# Patient Record
Sex: MALE | Race: WHITE | Employment: OTHER | URBAN - METROPOLITAN AREA
[De-identification: names, ages, dates, MRNs, and addresses within clinical notes are randomized per-mention and may not be internally consistent; named-entity substitution may affect disease eponyms.]

---

## 2017-10-30 ENCOUNTER — DOCUMENTATION ONLY (OUTPATIENT)
Dept: LAB | Facility: CLINIC | Age: 67
End: 2017-10-30

## 2017-10-30 DIAGNOSIS — Z00.00 ROUTINE GENERAL MEDICAL EXAMINATION AT A HEALTH CARE FACILITY: Primary | ICD-10-CM

## 2017-10-30 DIAGNOSIS — N18.30 CKD (CHRONIC KIDNEY DISEASE) STAGE 3, GFR 30-59 ML/MIN (H): ICD-10-CM

## 2017-10-30 DIAGNOSIS — E78.5 HYPERLIPIDEMIA LDL GOAL <160: ICD-10-CM

## 2017-10-30 NOTE — PROGRESS NOTES
Please review, associate diagnosis, and sign pending pre physical lab orders for patient's upcoming 11/3/17 lab appointment.     Thank you,  Lab

## 2017-11-10 DIAGNOSIS — N18.30 CKD (CHRONIC KIDNEY DISEASE) STAGE 3, GFR 30-59 ML/MIN (H): ICD-10-CM

## 2017-11-10 DIAGNOSIS — Z00.00 ROUTINE GENERAL MEDICAL EXAMINATION AT A HEALTH CARE FACILITY: ICD-10-CM

## 2017-11-10 DIAGNOSIS — E78.5 HYPERLIPIDEMIA LDL GOAL <160: ICD-10-CM

## 2017-11-10 LAB
ERYTHROCYTE [DISTWIDTH] IN BLOOD BY AUTOMATED COUNT: 13.8 % (ref 10–15)
HCT VFR BLD AUTO: 47.4 % (ref 40–53)
HGB BLD-MCNC: 15.6 G/DL (ref 13.3–17.7)
MCH RBC QN AUTO: 31.2 PG (ref 26.5–33)
MCHC RBC AUTO-ENTMCNC: 32.9 G/DL (ref 31.5–36.5)
MCV RBC AUTO: 95 FL (ref 78–100)
PLATELET # BLD AUTO: 234 10E9/L (ref 150–450)
RBC # BLD AUTO: 5 10E12/L (ref 4.4–5.9)
WBC # BLD AUTO: 5.7 10E9/L (ref 4–11)

## 2017-11-10 PROCEDURE — 86803 HEPATITIS C AB TEST: CPT | Performed by: INTERNAL MEDICINE

## 2017-11-10 PROCEDURE — 80061 LIPID PANEL: CPT | Performed by: INTERNAL MEDICINE

## 2017-11-10 PROCEDURE — 36415 COLL VENOUS BLD VENIPUNCTURE: CPT | Performed by: INTERNAL MEDICINE

## 2017-11-10 PROCEDURE — 85027 COMPLETE CBC AUTOMATED: CPT | Performed by: INTERNAL MEDICINE

## 2017-11-10 PROCEDURE — 80053 COMPREHEN METABOLIC PANEL: CPT | Performed by: INTERNAL MEDICINE

## 2017-11-11 LAB
ALBUMIN SERPL-MCNC: 4.2 G/DL (ref 3.4–5)
ALP SERPL-CCNC: 75 U/L (ref 40–150)
ALT SERPL W P-5'-P-CCNC: 15 U/L (ref 0–70)
ANION GAP SERPL CALCULATED.3IONS-SCNC: 11 MMOL/L (ref 3–14)
AST SERPL W P-5'-P-CCNC: 18 U/L (ref 0–45)
BILIRUB SERPL-MCNC: 1.1 MG/DL (ref 0.2–1.3)
BUN SERPL-MCNC: 10 MG/DL (ref 7–30)
CALCIUM SERPL-MCNC: 9.1 MG/DL (ref 8.5–10.1)
CHLORIDE SERPL-SCNC: 98 MMOL/L (ref 94–109)
CHOLEST SERPL-MCNC: 259 MG/DL
CO2 SERPL-SCNC: 24 MMOL/L (ref 20–32)
CREAT SERPL-MCNC: 1.21 MG/DL (ref 0.66–1.25)
GFR SERPL CREATININE-BSD FRML MDRD: 60 ML/MIN/1.7M2
GLUCOSE SERPL-MCNC: 90 MG/DL (ref 70–99)
HCV AB SERPL QL IA: NONREACTIVE
HDLC SERPL-MCNC: 63 MG/DL
LDLC SERPL CALC-MCNC: 168 MG/DL
NONHDLC SERPL-MCNC: 196 MG/DL
POTASSIUM SERPL-SCNC: 4.5 MMOL/L (ref 3.4–5.3)
PROT SERPL-MCNC: 7.6 G/DL (ref 6.8–8.8)
SODIUM SERPL-SCNC: 133 MMOL/L (ref 133–144)
TRIGL SERPL-MCNC: 141 MG/DL

## 2017-11-15 NOTE — PATIENT INSTRUCTIONS
The 10-year ASCVD risk score (Karolinalamberto SMITH Jr, et al., 2013) is: 15.1%    Values used to calculate the score:      Age: 67 years      Sex: Male      Is Non- : No      Diabetic: No      Tobacco smoker: No      Systolic Blood Pressure: 138 mmHg      Is BP treated: No      HDL Cholesterol: 63 mg/dL      Total Cholesterol: 259 mg/dL       Be sure to come back in 3 to 4 months for a follow up fasting lab, call before you come.    Dayton Francisco M.D.                    Preventive Health Recommendations:       Male Ages 65 and over    Yearly exam:             See your health care provider every year in order to  o   Review health changes.   o   Discuss preventive care.    o   Review your medicines if your doctor has prescribed any.    Talk with your health care provider about whether you should have a test to screen for prostate cancer (PSA).    Every 3 years, have a diabetes test (fasting glucose). If you are at risk for diabetes, you should have this test more often.    Every 5 years, have a cholesterol test. Have this test more often if you are at risk for high cholesterol or heart disease.     Every 10 years, have a colonoscopy. Or, have a yearly FIT test (stool test). These exams will check for colon cancer.    Talk to with your health care provider about screening for Abdominal Aortic Aneurysm if you have a family history of AAA or have a history of smoking.  Shots:     Get a flu shot each year.     Get a tetanus shot every 10 years.     Talk to your doctor about your pneumonia vaccines. There are now two you should receive - Pneumovax (PPSV 23) and Prevnar (PCV 13).    Talk to your doctor about a shingles vaccine.     Talk to your doctor about the hepatitis B vaccine.  Nutrition:     Eat at least 5 servings of fruits and vegetables each day.     Eat whole-grain bread, whole-wheat pasta and brown rice instead of white grains and rice.     Talk to your doctor about Calcium and Vitamin D.    Lifestyle    Exercise for at least 150 minutes a week (30 minutes a day, 5 days a week). This will help you control your weight and prevent disease.     Limit alcohol to one drink per day.     No smoking.     Wear sunscreen to prevent skin cancer.     See your dentist every six months for an exam and cleaning.     See your eye doctor every 1 to 2 years to screen for conditions such as glaucoma, macular degeneration and cataracts.

## 2017-11-15 NOTE — PROGRESS NOTES
"  SUBJECTIVE:   Matias Daugherty is a 67 year old male who presents for Preventive Visit.    He is doing fine, retired, works out reg.      Since May occasionally very slight discomfort, comes and goes different areas of abdomen, no n/v, no fevers, chills or night sweats or weight loss, no bowel or urine changes.  He otherwise feels fine.               Past Medical History:      Past Medical History:   Diagnosis Date     Abdominal pain 2004    ct with liver lesion then mri done and felt to be hemangioma     CKD (chronic kidney disease) stage 3, GFR 30-59 ml/min      Hx of colonoscopy 2008    nl     Hypercholesteremia      Liver hemangioma 2004    on above ct     Microhematuria 2004    Dr. Jameson, cysto and ct, ct as above             Past Surgical History:      Past Surgical History:   Procedure Laterality Date     NO HISTORY OF SURGERY               Social History:     Social History     Social History     Marital status: Single     Spouse name: N/A     Number of children: 0     Years of education: N/A     Occupational History     [employer], retired Self     Social History Main Topics     Smoking status: Never Smoker     Smokeless tobacco: Never Used     Alcohol use 0.0 oz/week     0 Standard drinks or equivalent per week      Comment: rare     Drug use: No     Sexual activity: Not Currently     Partners: Female     Other Topics Concern     Not on file     Social History Narrative             Family History:   reviewed         Allergies:   No Known Allergies          Medications:     Current Outpatient Prescriptions   Medication Sig Dispense Refill     NO ACTIVE MEDICATIONS                  Review of Systems:   The 10 point Review of Systems is negative other than noted in the HPI           Physical Exam:   Blood pressure 126/86, pulse 86, temperature 98.1  F (36.7  C), temperature source Oral, height 6' 1.5\" (1.867 m), weight 173 lb (78.5 kg), SpO2 97 %.    Exam:  Constitutional: healthy appearing, alert and in no " distress  Heent: Normocephalic. Head without obvious masses or lesions. PERRLDC, EOMI. Mouth exam within normal limits: tongue, mucous membranes, posterior pharynx all normal, no lesions or abnormalities seen.  Tm's and canals within normal limits bilaterally. Neck supple, no nuchal rigidity or masses. No supraclavicular, or cervical adenopathy. Thyroid symmetric, no masses.  Cardiovascular: Regular rate and rhythm, no murmer, rub or gallops.  JVP not elevated, no edema.  Carotids within normal limits bilaterally, no bruits.  Respiratory: Normal respiratory effort.  Lungs clear, normal flow, no wheezing or crackles.  Breasts: Normal bilaterally.  No masses or lesions.  Nipples within normal limites.  No axillary lesions or nodes.  Gastrointestinal: Normal active bowel sounds.   Soft, not tender, no masses, guarding or rebound.  No hepatosplenomegaly.   Genitourinary: Rectal min benign prostatic hypertrophy, smooth  Musculoskeletal: extremities normal, no gross deformities noted.  Skin: no suspicious lesions or rashes   Neurologic: Mental status within normal limits.  Speech fluent.  No gross motor abnormalities and gait intact.  Psychiatric: mentation appears normal and affect normal.         Data:   Labs reviewed with patient         Assessment:   1. Normal complete physical exam  2. Elevated cholesterol, I rec adding statin, he wants to try exercise, diet first  3. Ckd, stable  4. Abdomen pain, neg exam, doubt malig cause, cancer  5. hcm         Plan:   Did not want any immunizations  Up to date colon  For abdomen pain if not gone soon or worsens to call  Exercise, diet  Follow up labs 3 to 4 months      Dayton Francisco M.D.            Are you in the first 12 months of your Medicare Part B coverage?  Yes    Healthy Habits:    Do you get at least three servings of calcium containing foods daily (dairy, green leafy vegetables, etc.)? no    Amount of exercise or daily activities, outside of work: 3 day(s) per  week    Problems taking medications regularly No    Medication side effects: No    Have you had an eye exam in the past two years? no    Do you see a dentist twice per year? no    Do you have sleep apnea, excessive snoring or daytime drowsiness?no    COGNITIVE SCREEN  1) Repeat 3 items (Banana, Sunrise, Chair)    2) Clock draw:   3) 3 item recall:   Results: 3 items recalled: COGNITIVE IMPAIRMENT LESS LIKELY    Mini-CogTM Copyright JAYLIN Salgado. Licensed by the author for use in [facility]; reprinted with permission (rory@[email or web address]). All rights reserved.                    Reviewed and updated as needed this visit by clinical staff         Reviewed and updated as needed this visit by Provider      Social History   Substance Use Topics     Smoking status: Never Smoker     Smokeless tobacco: Never Used     Alcohol use 0.0 oz/week     0 Standard drinks or equivalent per week      Comment: rare       The patient does not drink >3 drinks per day nor >7 drinks per week.     Today's PHQ-2 Score:   PHQ-2 ( 1999 Pfizer) 11/15/2016 11/12/2015   Q1: Little interest or pleasure in doing things 0 0   Q2: Feeling down, depressed or hopeless 0 0   PHQ-2 Score 0 0         Do you feel safe in your environment - Yes    Do you have a Health Care Directive?: No: Advance care planning was reviewed with patient; patient declined at this time.      Current providers sharing in care for this patient include: Patient Care Team:  Dayton Francisco MD as PCP - General (Internal Medicine)      Hearing impairment: No    Ability to successfully perform activities of daily living: Yes, no assistance needed     Fall risk:         Home safety:  none identified  click delete button to remove this line now    The following health maintenance items are reviewed in Epic and correct as of today:Health Maintenance   Topic Date Due     INFLUENZA VACCINE (SYSTEM ASSIGNED)  09/01/2017     FALL RISK ASSESSMENT  09/19/2017     PNEUMOCOCCAL (1 of 2  "- PCV13) 09/19/2017     AORTIC ANEURYSM SCREENING (SYSTEM ASSIGNED)  09/19/2017     ADVANCE DIRECTIVE PLANNING Q5 YRS  11/01/2017     COLON CANCER SCREEN (SYSTEM ASSIGNED)  10/31/2018     TETANUS IMMUNIZATION (SYSTEM ASSIGNED)  06/26/2019     LIPID SCREEN Q5 YR MALE (SYSTEM ASSIGNED)  11/10/2022     HEPATITIS C SCREENING  Completed       End of Life Planning:  Patient currently has an advanced directive: No.  I have verified the patient's ablity to prepare an advanced directive/make health care decisions.  Literature was provided to assist patient in preparing an advanced directive.    COUNSELING:  Reviewed preventive health counseling, as reflected in patient instructions       Regular exercise       Healthy diet/nutrition        Estimated body mass index is 21.6 kg/(m^2) as calculated from the following:    Height as of 11/15/16: 6' 1.5\" (1.867 m).    Weight as of 11/15/16: 166 lb (75.3 kg).     reports that he has never smoked. He has never used smokeless tobacco.        Appropriate preventive services were discussed with this patient, including applicable screening as appropriate for cardiovascular disease, diabetes, osteopenia/osteoporosis, and glaucoma.  As appropriate for age/gender, discussed screening for colorectal cancer, prostate cancer, breast cancer, and cervical cancer. Checklist reviewing preventive services available has been given to the patient.    Reviewed patients plan of care and provided an AVS. The Basic Care Plan (routine screening as documented in Health Maintenance) for Matias meets the Care Plan requirement. This Care Plan has been established and reviewed with the Patient.    Counseling Resources:  ATP IV Guidelines  Pooled Cohorts Equation Calculator  Breast Cancer Risk Calculator  FRAX Risk Assessment  ICSI Preventive Guidelines  Dietary Guidelines for Americans, 2010  USDA's MyPlate  ASA Prophylaxis  Lung CA Screening    Dayton Francisco MD  [facility]  "

## 2017-11-16 ENCOUNTER — OFFICE VISIT (OUTPATIENT)
Dept: FAMILY MEDICINE | Facility: CLINIC | Age: 67
End: 2017-11-16
Payer: COMMERCIAL

## 2017-11-16 VITALS
HEIGHT: 74 IN | DIASTOLIC BLOOD PRESSURE: 86 MMHG | OXYGEN SATURATION: 97 % | SYSTOLIC BLOOD PRESSURE: 126 MMHG | BODY MASS INDEX: 22.2 KG/M2 | HEART RATE: 86 BPM | WEIGHT: 173 LBS | TEMPERATURE: 98.1 F

## 2017-11-16 DIAGNOSIS — N18.30 CKD (CHRONIC KIDNEY DISEASE) STAGE 3, GFR 30-59 ML/MIN (H): ICD-10-CM

## 2017-11-16 DIAGNOSIS — D18.03 LIVER HEMANGIOMA: ICD-10-CM

## 2017-11-16 DIAGNOSIS — E78.5 HYPERLIPIDEMIA LDL GOAL <160: ICD-10-CM

## 2017-11-16 DIAGNOSIS — Z00.00 ROUTINE GENERAL MEDICAL EXAMINATION AT A HEALTH CARE FACILITY: Primary | ICD-10-CM

## 2017-11-16 DIAGNOSIS — Z12.5 SCREENING FOR PROSTATE CANCER: ICD-10-CM

## 2017-11-16 PROCEDURE — G0438 PPPS, INITIAL VISIT: HCPCS | Performed by: INTERNAL MEDICINE

## 2017-11-16 NOTE — NURSING NOTE
"Chief Complaint   Patient presents with     Medicare Visit       Initial BP (!) 138/100  Pulse 86  Temp 98.1  F (36.7  C) (Oral)  Ht 6' 1.5\" (1.867 m)  Wt 173 lb (78.5 kg)  SpO2 97%  BMI 22.52 kg/m2 Estimated body mass index is 22.52 kg/(m^2) as calculated from the following:    Height as of this encounter: 6' 1.5\" (1.867 m).    Weight as of this encounter: 173 lb (78.5 kg).  Medication Reconciliation: complete   Yessi Sylvester CMA      "

## 2017-11-16 NOTE — MR AVS SNAPSHOT
After Visit Summary   11/16/2017    Matias Daugherty    MRN: [id number]           Patient Information     Date Of Birth          1950        Visit Information        Provider Department      11/16/2017 1:00 PM Dayton Francisco MD [facility]        Today's Diagnoses     Routine general medical examination at a health care facility    -  1    CKD (chronic kidney disease) stage 3, GFR 30-59 ml/min        Hyperlipidemia LDL goal <160        Liver hemangioma        Screening for prostate cancer          Care Instructions    The 10-year ASCVD risk score (Greenfieldlamberto SMITH Jr, et al., 2013) is: 15.1%    Values used to calculate the score:      Age: 67 years      Sex: Male      Is Non- : No      Diabetic: No      Tobacco smoker: No      Systolic Blood Pressure: 138 mmHg      Is BP treated: No      HDL Cholesterol: 63 mg/dL      Total Cholesterol: 259 mg/dL       Be sure to come back in 3 to 4 months for a follow up fasting lab, call before you come.    Dayton Francisco M.D.                    Preventive Health Recommendations:       Male Ages 65 and over    Yearly exam:             See your health care provider every year in order to  o   Review health changes.   o   Discuss preventive care.    o   Review your medicines if your doctor has prescribed any.    Talk with your health care provider about whether you should have a test to screen for prostate cancer (PSA).    Every 3 years, have a diabetes test (fasting glucose). If you are at risk for diabetes, you should have this test more often.    Every 5 years, have a cholesterol test. Have this test more often if you are at risk for high cholesterol or heart disease.     Every 10 years, have a colonoscopy. Or, have a yearly FIT test (stool test). These exams will check for colon cancer.    Talk to with your health care provider about screening for Abdominal Aortic Aneurysm if you have a family history of AAA or have a history of  smoking.  Shots:     Get a flu shot each year.     Get a tetanus shot every 10 years.     Talk to your doctor about your pneumonia vaccines. There are now two you should receive - Pneumovax (PPSV 23) and Prevnar (PCV 13).    Talk to your doctor about a shingles vaccine.     Talk to your doctor about the hepatitis B vaccine.  Nutrition:     Eat at least 5 servings of fruits and vegetables each day.     Eat whole-grain bread, whole-wheat pasta and brown rice instead of white grains and rice.     Talk to your doctor about Calcium and Vitamin D.   Lifestyle    Exercise for at least 150 minutes a week (30 minutes a day, 5 days a week). This will help you control your weight and prevent disease.     Limit alcohol to one drink per day.     No smoking.     Wear sunscreen to prevent skin cancer.     See your dentist every six months for an exam and cleaning.     See your eye doctor every 1 to 2 years to screen for conditions such as glaucoma, macular degeneration and cataracts.          Follow-ups after your visit        Future tests that were ordered for you today     Open Future Orders        Priority Expected Expires Ordered    Lipid panel reflex to direct LDL Non-fasting Routine  5/15/2018 11/16/2017    Prostate spec antigen screen Routine 12/16/2017 5/15/2018 11/16/2017            Who to contact     If you have questions or need follow up information about today's clinic visit or your schedule please contact [facility] directly at [phone].  Normal or non-critical lab and imaging results will be communicated to you by MyChart, letter or phone within 4 business days after the clinic has received the results. If you do not hear from us within 7 days, please contact the clinic through MyChart or phone. If you have a critical or abnormal lab result, we will notify you by phone as soon as possible.  Submit refill requests through [employer] or call your pharmacy and they will forward the refill request to us.  "Please allow 3 business days for your refill to be completed.          Additional Information About Your Visit        MyChart Information     [employer] lets you send messages to your doctor, view your test results, renew your prescriptions, schedule appointments and more. To sign up, go to [email or web address] . Click on \"Log in\" on the left side of the screen, which will take you to the Welcome page. Then click on \"Sign up Now\" on the right side of the page.     You will be asked to enter the access code listed below, as well as some personal information. Please follow the directions to create your username and password.     Your access code is: [email or web address]  Expires: 2018  1:23 PM     Your access code will  in 90 days. If you need help or a new code, please call your [facility] clinic or [phone].        Care EveryWhere ID     This is your Care EveryWhere ID. This could be used by other organizations to access your [facility] medical records  [id number]        Your Vitals Were     Pulse Temperature Height Pulse Oximetry BMI (Body Mass Index)       86 98.1  F (36.7  C) (Oral) 6' 1.5\" (1.867 m) 97% 22.52 kg/m2        Blood Pressure from Last 3 Encounters:   17 126/86   11/15/16 124/76   11/12/15 136/86    Weight from Last 3 Encounters:   17 173 lb (78.5 kg)   11/15/16 166 lb (75.3 kg)   11/12/15 185 lb (83.9 kg)               Primary Care Provider Office Phone # Fax #    Dayton Francisco [phone] [address] [facility] [address]  [place] [address]        Equal Access to Services     SANDRA KENT AH: Meño Nelson, romi murray, qaroyal kaalmada val, adilson [place]. So [facility] [phone].    ATENCIÓN: Si habla español, tiene a zhong disposición servicios gratuitos de asistencia lingüística. Llame al [phone].    We comply with applicable federal civil rights laws and [place] laws. We do not discriminate on the basis of " race, color, national origin, age, disability, sex, sexual orientation, or gender identity.            Thank you!     Thank you for choosing [facility]  for your care. Our goal is always to provide you with excellent care. Hearing back from our patients is one way we can continue to improve our services. Please take a few minutes to complete the written survey that you may receive in the mail after your visit with us. Thank you!             Your Updated Medication List - Protect others around you: Learn how to safely use, store and throw away your medicines at www.disposemymeds.org.          This list is accurate as of: 11/16/17  1:23 PM.  Always use your most recent med list.                   Brand Name Dispense Instructions for use Diagnosis    NO ACTIVE MEDICATIONS

## 2018-11-08 DIAGNOSIS — Z12.5 SCREENING FOR PROSTATE CANCER: ICD-10-CM

## 2018-11-08 DIAGNOSIS — Z00.00 ROUTINE GENERAL MEDICAL EXAMINATION AT A HEALTH CARE FACILITY: ICD-10-CM

## 2018-11-08 DIAGNOSIS — E78.5 HYPERLIPIDEMIA LDL GOAL <160: ICD-10-CM

## 2018-11-08 LAB
ALBUMIN UR-MCNC: NEGATIVE MG/DL
APPEARANCE UR: CLEAR
BILIRUB UR QL STRIP: NEGATIVE
COLOR UR AUTO: YELLOW
ERYTHROCYTE [DISTWIDTH] IN BLOOD BY AUTOMATED COUNT: 13.5 % (ref 10–15)
GLUCOSE UR STRIP-MCNC: NEGATIVE MG/DL
HCT VFR BLD AUTO: 48.9 % (ref 40–53)
HGB BLD-MCNC: 15.9 G/DL (ref 13.3–17.7)
HGB UR QL STRIP: ABNORMAL
KETONES UR STRIP-MCNC: NEGATIVE MG/DL
LEUKOCYTE ESTERASE UR QL STRIP: NEGATIVE
MCH RBC QN AUTO: 30.8 PG (ref 26.5–33)
MCHC RBC AUTO-ENTMCNC: 32.5 G/DL (ref 31.5–36.5)
MCV RBC AUTO: 95 FL (ref 78–100)
NITRATE UR QL: NEGATIVE
PH UR STRIP: 5 PH (ref 5–7)
PLATELET # BLD AUTO: 214 10E9/L (ref 150–450)
RBC # BLD AUTO: 5.17 10E12/L (ref 4.4–5.9)
RBC #/AREA URNS AUTO: ABNORMAL /HPF
SOURCE: ABNORMAL
SP GR UR STRIP: <=1.005 (ref 1–1.03)
UROBILINOGEN UR STRIP-ACNC: 0.2 EU/DL (ref 0.2–1)
WBC # BLD AUTO: 4.9 10E9/L (ref 4–11)
WBC #/AREA URNS AUTO: ABNORMAL /HPF

## 2018-11-08 PROCEDURE — 80053 COMPREHEN METABOLIC PANEL: CPT | Performed by: INTERNAL MEDICINE

## 2018-11-08 PROCEDURE — 36415 COLL VENOUS BLD VENIPUNCTURE: CPT | Performed by: INTERNAL MEDICINE

## 2018-11-08 PROCEDURE — 80061 LIPID PANEL: CPT | Performed by: INTERNAL MEDICINE

## 2018-11-08 PROCEDURE — 81001 URINALYSIS AUTO W/SCOPE: CPT | Performed by: INTERNAL MEDICINE

## 2018-11-08 PROCEDURE — 85027 COMPLETE CBC AUTOMATED: CPT | Performed by: INTERNAL MEDICINE

## 2018-11-08 PROCEDURE — G0103 PSA SCREENING: HCPCS | Performed by: INTERNAL MEDICINE

## 2018-11-09 LAB
ALBUMIN SERPL-MCNC: 4.1 G/DL (ref 3.4–5)
ALP SERPL-CCNC: 85 U/L (ref 40–150)
ALT SERPL W P-5'-P-CCNC: 17 U/L (ref 0–70)
ANION GAP SERPL CALCULATED.3IONS-SCNC: 8 MMOL/L (ref 3–14)
AST SERPL W P-5'-P-CCNC: 16 U/L (ref 0–45)
BILIRUB SERPL-MCNC: 1 MG/DL (ref 0.2–1.3)
BUN SERPL-MCNC: 13 MG/DL (ref 7–30)
CALCIUM SERPL-MCNC: 9.4 MG/DL (ref 8.5–10.1)
CHLORIDE SERPL-SCNC: 102 MMOL/L (ref 94–109)
CHOLEST SERPL-MCNC: 228 MG/DL
CO2 SERPL-SCNC: 25 MMOL/L (ref 20–32)
CREAT SERPL-MCNC: 1.44 MG/DL (ref 0.66–1.25)
GFR SERPL CREATININE-BSD FRML MDRD: 49 ML/MIN/1.7M2
GLUCOSE SERPL-MCNC: 89 MG/DL (ref 70–99)
HDLC SERPL-MCNC: 44 MG/DL
LDLC SERPL CALC-MCNC: 152 MG/DL
NONHDLC SERPL-MCNC: 184 MG/DL
POTASSIUM SERPL-SCNC: 5.3 MMOL/L (ref 3.4–5.3)
PROT SERPL-MCNC: 7.9 G/DL (ref 6.8–8.8)
PSA SERPL-ACNC: 2.88 UG/L (ref 0–4)
SODIUM SERPL-SCNC: 135 MMOL/L (ref 133–144)
TRIGL SERPL-MCNC: 158 MG/DL

## 2018-11-14 NOTE — PATIENT INSTRUCTIONS
Preventive Health Recommendations:     See your health care provider every year to    Review health changes.     Discuss preventive care.      Review your medicines if your doctor has prescribed any.    Talk with your health care provider about whether you should have a test to screen for prostate cancer (PSA).    Every 3 years, have a diabetes test (fasting glucose). If you are at risk for diabetes, you should have this test more often.    Every 5 years, have a cholesterol test. Have this test more often if you are at risk for high cholesterol or heart disease.     Every 10 years, have a colonoscopy. Or, have a yearly FIT test (stool test). These exams will check for colon cancer.    Talk to with your health care provider about screening for Abdominal Aortic Aneurysm if you have a family history of AAA or have a history of smoking.  Shots:     Get a flu shot each year.     Get a tetanus shot every 10 years.     Talk to your doctor about your pneumonia vaccines. There are now two you should receive - Pneumovax (PPSV 23) and Prevnar (PCV 13).    Talk to your pharmacist about a shingles vaccine.     Talk to your doctor about the hepatitis B vaccine.  Nutrition:     Eat at least 5 servings of fruits and vegetables each day.     Eat whole-grain bread, whole-wheat pasta and brown rice instead of white grains and rice.     Get adequate Calcium and Vitamin D.   Lifestyle    Exercise for at least 150 minutes a week (30 minutes a day, 5 days a week). This will help you control your weight and prevent disease.     Limit alcohol to one drink per day.     No smoking.     Wear sunscreen to prevent skin cancer.     See your dentist every six months for an exam and cleaning.     See your eye doctor every 1 to 2 years to screen for conditions such as glaucoma, macular degeneration and cataracts.    Personalized Prevention Plan  You are due for the preventive services outlined below.  Your care team is available to assist you in  scheduling these services.  If you have already completed any of these items, please share that information with your care team to update in your medical record.    Health Maintenance Due   Topic Date Due     FALL RISK ASSESSMENT  09/19/2017     Pneumococcal Vaccine (1 of 2 - PCV13) 09/19/2017     AORTIC ANEURYSM SCREENING (SYSTEM ASSIGNED)  09/19/2017     Discuss Advance Directive Planning  11/01/2017     Flu Vaccine (1) 09/01/2018     Colon Cancer Screening - every 10 years.  10/31/2018     Depression Assessment 2 - yearly  11/16/2018

## 2018-11-14 NOTE — PROGRESS NOTES
"  SUBJECTIVE:   Matias Daugherty is a 68 year old male who presents for Preventive Visit.    The patient feels fine, no c/o on review of systems, works out.  Goes to [place] in winter.               Past Medical History:      Past Medical History:   Diagnosis Date     Abdominal pain 2004    ct with liver lesion then mri done and felt to be hemangioma     CKD (chronic kidney disease) stage 3, GFR 30-59 ml/min (H)      Hx of colonoscopy 2008    nl     Hypercholesteremia      Liver hemangioma 2004    on above ct     Microhematuria 2004    Dr. Jameson, cysto and ct, ct as above             Past Surgical History:      Past Surgical History:   Procedure Laterality Date     NO HISTORY OF SURGERY               Social History:     Social History     Social History     Marital status: Single     Spouse name: N/A     Number of children: 0     Years of education: N/A     Occupational History     [employer] firm, retired Self     Social History Main Topics     Smoking status: Never Smoker     Smokeless tobacco: Never Used     Alcohol use 0.0 oz/week     0 Standard drinks or equivalent per week      Comment: rare     Drug use: No     Sexual activity: Not Currently     Partners: Female     Other Topics Concern     Not on file     Social History Narrative             Family History:   reviewed         Allergies:   No Known Allergies          Medications:     Current Outpatient Prescriptions   Medication Sig Dispense Refill     NO ACTIVE MEDICATIONS                  Review of Systems:   The 10 point Review of Systems is negative other than noted in the HPI           Physical Exam:   Blood pressure 138/86, pulse 79, temperature 97.4  F (36.3  C), temperature source Oral, height 6' 1.5\" (1.867 m), weight 184 lb (83.5 kg), SpO2 98 %.    Exam:  Constitutional: healthy appearing, alert and in no distress  Heent: Normocephalic. Head without obvious masses or lesions. PERRLDC, EOMI. Mouth exam within normal limits: tongue, mucous membranes, posterior " pharynx all normal, no lesions or abnormalities seen.  Tm's and canals within normal limits bilaterally. Neck supple, no nuchal rigidity or masses. No supraclavicular, or cervical adenopathy. Thyroid symmetric, no masses.  Cardiovascular: Regular rate and rhythm, no murmer, rub or gallops.  JVP not elevated, no edema.  Carotids within normal limits bilaterally, no bruits.  Respiratory: Normal respiratory effort.  Lungs clear, normal flow, no wheezing or crackles.  Breasts: Normal bilaterally.  No masses or lesions.  Nipples within normal limites.  No axillary lesions or nodes.  Gastrointestinal: Normal active bowel sounds.   Soft, not tender, no masses, guarding or rebound.  No hepatosplenomegaly.   Genitourinary: Rectal mod bph  Musculoskeletal: extremities normal, no gross deformities noted.  Skin: no suspicious lesions or rashes   Neurologic: Mental status within normal limits.  Speech fluent.  No gross motor abnormalities and gait intact.  Psychiatric: mentation appears normal and affect normal.         Data:   Labs reviewed with patient         Assessment:   1. Normal complete physical exam  2. Elevated blood pressure, he will check and call if up  3. Ckd, higher this year, ?real, to get follow up creat, he wants to do in [place]  4. Elevated cholesterol, I rec statin, he does not want  5. hcm         Plan:   Colon exam  Td  Did not want prevnar or zostavax  Exercise, diet  Follow up creat      Dayton Francisco M.D.            Are you in the first 12 months of your Medicare Part B coverage?  No    Physical Health:    In general, how would you rate your overall physical health? good    Outside of work, how many days during the week do you exercise? 4-5 days/week    Outside of work, approximately how many minutes a day do you exercise?45-60 minutes    If you drink alcohol do you typically have >3 drinks per day or >7 drinks per week? No    Do you usually eat at least 4 servings of fruit and vegetables a day, include  "whole grains & fiber and avoid regularly eating high fat or \"junk\" foods? Yes    Do you have any problems taking medications regularly?  No    Do you have any side effects from medications? not applicable    Needs assistance for the following daily activities: no assistance needed    Which of the following safety concerns are present in your home?:  none identified     Hearing impairment: No    In the past 6 months, have you been bothered by leaking of urine? no    Mental Health:    In general, how would you rate your overall mental or emotional health? good  PHQ-2 Score:      Additional concerns to address?  No    Fall risk:               COGNITIVE SCREEN  1) Repeat 3 items (Leader, Season, Table)    2) Clock draw:   3) 3 item recall: Recalls 3 objects  Results: 3 items recalled: COGNITIVE IMPAIRMENT LESS LIKELY    Mini-CogTM Copyright S Naomi. Licensed by the author for use in [facility] [employer]; reprinted with permission (rory@[facility]). All rights reserved.                Reviewed and updated as needed this visit by clinical staff         Reviewed and updated as needed this visit by Provider        Social History   Substance Use Topics     Smoking status: Never Smoker     Smokeless tobacco: Never Used     Alcohol use 0.0 oz/week     0 Standard drinks or equivalent per week      Comment: rare                             Do you feel safe in your environment - Yes    Do you have a Health Care Directive?: No: Advance care planning was reviewed with patient; patient declined at this time.    Current providers sharing in care for this patient include:   Patient Care Team:  Dayton Francisco MD as PCP - General (Internal Medicine)    The following health maintenance items are reviewed in Epic and correct as of today:  Health Maintenance   Topic Date Due     FALL RISK ASSESSMENT  09/19/2017     PNEUMOCOCCAL (1 of 2 - PCV13) 09/19/2017     AORTIC ANEURYSM SCREENING (SYSTEM ASSIGNED)  09/19/2017     ADVANCE " "DIRECTIVE PLANNING Q5 YRS  11/01/2017     INFLUENZA VACCINE (1) 09/01/2018     COLON CANCER SCREEN (SYSTEM ASSIGNED)  10/31/2018     PHQ-2 Q1 YR  11/16/2018     TETANUS IMMUNIZATION (SYSTEM ASSIGNED)  06/26/2019     LIPID SCREEN Q5 YR MALE (SYSTEM ASSIGNED)  11/08/2023     HEPATITIS C SCREENING  Completed       End of Life Planning:  Patient currently has an advanced directive: No.  I have verified the patient's ablity to prepare an advanced directive/make health care decisions.  Literature was provided to assist patient in preparing an advanced directive.    COUNSELING:  Reviewed preventive health counseling, as reflected in patient instructions       Regular exercise       Healthy diet/nutrition    BP Readings from Last 1 Encounters:   11/16/17 126/86     Estimated body mass index is 22.52 kg/(m^2) as calculated from the following:    Height as of 11/16/17: 6' 1.5\" (1.867 m).    Weight as of 11/16/17: 173 lb (78.5 kg).           reports that he has never smoked. He has never used smokeless tobacco.      Appropriate preventive services were discussed with this patient, including applicable screening as appropriate for cardiovascular disease, diabetes, osteopenia/osteoporosis, and glaucoma.  As appropriate for age/gender, discussed screening for colorectal cancer, prostate cancer, breast cancer, and cervical cancer. Checklist reviewing preventive services available has been given to the patient.    Reviewed patients plan of care and provided an AVS. The Basic Care Plan (routine screening as documented in Health Maintenance) for Matias meets the Care Plan requirement. This Care Plan has been established and reviewed with the Patient.    Counseling Resources:  ATP IV Guidelines  Pooled Cohorts Equation Calculator  Breast Cancer Risk Calculator  FRAX Risk Assessment  ICSI Preventive Guidelines  Dietary Guidelines for Americans, 2010  USDA's MyPlate  ASA Prophylaxis  Lung CA Screening    Dayton Francisco MD  [place] " [facility]

## 2018-11-15 ENCOUNTER — OFFICE VISIT (OUTPATIENT)
Dept: FAMILY MEDICINE | Facility: CLINIC | Age: 68
End: 2018-11-15
Payer: COMMERCIAL

## 2018-11-15 VITALS
DIASTOLIC BLOOD PRESSURE: 86 MMHG | BODY MASS INDEX: 23.61 KG/M2 | HEART RATE: 79 BPM | TEMPERATURE: 97.4 F | OXYGEN SATURATION: 98 % | WEIGHT: 184 LBS | HEIGHT: 74 IN | SYSTOLIC BLOOD PRESSURE: 138 MMHG

## 2018-11-15 DIAGNOSIS — N18.30 CKD (CHRONIC KIDNEY DISEASE) STAGE 3, GFR 30-59 ML/MIN (H): ICD-10-CM

## 2018-11-15 DIAGNOSIS — Z00.00 ROUTINE GENERAL MEDICAL EXAMINATION AT A HEALTH CARE FACILITY: Primary | ICD-10-CM

## 2018-11-15 DIAGNOSIS — Z23 NEED FOR VACCINATION: ICD-10-CM

## 2018-11-15 DIAGNOSIS — E78.5 HYPERLIPIDEMIA LDL GOAL <160: ICD-10-CM

## 2018-11-15 PROCEDURE — 99397 PER PM REEVAL EST PAT 65+ YR: CPT | Mod: 25 | Performed by: INTERNAL MEDICINE

## 2018-11-15 PROCEDURE — 90471 IMMUNIZATION ADMIN: CPT | Performed by: INTERNAL MEDICINE

## 2018-11-15 PROCEDURE — 90714 TD VACC NO PRESV 7 YRS+ IM: CPT | Performed by: INTERNAL MEDICINE

## 2018-11-15 NOTE — MR AVS SNAPSHOT
After Visit Summary   11/15/2018    Matias Daugherty    MRN: [id number]           Patient Information     Date Of Birth          1950        Visit Information        Provider Department      11/15/2018 1:00 PM Dayton Francisco MD [facility]        Today's Diagnoses     Routine general medical examination at a health care facility    -  1    CKD (chronic kidney disease) stage 3, GFR 30-59 ml/min (H)        Hyperlipidemia LDL goal <160          Care Instructions      Preventive Health Recommendations:     See your health care provider every year to    Review health changes.     Discuss preventive care.      Review your medicines if your doctor has prescribed any.    Talk with your health care provider about whether you should have a test to screen for prostate cancer (PSA).    Every 3 years, have a diabetes test (fasting glucose). If you are at risk for diabetes, you should have this test more often.    Every 5 years, have a cholesterol test. Have this test more often if you are at risk for high cholesterol or heart disease.     Every 10 years, have a colonoscopy. Or, have a yearly FIT test (stool test). These exams will check for colon cancer.    Talk to with your health care provider about screening for Abdominal Aortic Aneurysm if you have a family history of AAA or have a history of smoking.  Shots:     Get a flu shot each year.     Get a tetanus shot every 10 years.     Talk to your doctor about your pneumonia vaccines. There are now two you should receive - Pneumovax (PPSV 23) and Prevnar (PCV 13).    Talk to your pharmacist about a shingles vaccine.     Talk to your doctor about the hepatitis B vaccine.  Nutrition:     Eat at least 5 servings of fruits and vegetables each day.     Eat whole-grain bread, whole-wheat pasta and brown rice instead of white grains and rice.     Get adequate Calcium and Vitamin D.   Lifestyle    Exercise for at least 150 minutes a week (30 minutes a  day, 5 days a week). This will help you control your weight and prevent disease.     Limit alcohol to one drink per day.     No smoking.     Wear sunscreen to prevent skin cancer.     See your dentist every six months for an exam and cleaning.     See your eye doctor every 1 to 2 years to screen for conditions such as glaucoma, macular degeneration and cataracts.    Personalized Prevention Plan  You are due for the preventive services outlined below.  Your care team is available to assist you in scheduling these services.  If you have already completed any of these items, please share that information with your care team to update in your medical record.    Health Maintenance Due   Topic Date Due     FALL RISK ASSESSMENT  09/19/2017     Pneumococcal Vaccine (1 of 2 - PCV13) 09/19/2017     AORTIC ANEURYSM SCREENING (SYSTEM ASSIGNED)  09/19/2017     Discuss Advance Directive Planning  11/01/2017     Flu Vaccine (1) 09/01/2018     Colon Cancer Screening - every 10 years.  10/31/2018     Depression Assessment 2 - yearly  11/16/2018             Follow-ups after your visit        Additional Services     GASTROENTEROLOGY ADULT REF PROCEDURE ONLY [facility] [phone]; Dr. DOMINIC Mike                 Follow-up notes from your care team     Return in about 14 days (around 11/29/2018) for Lab Work.      Future tests that were ordered for you today     Open Future Orders        Priority Expected Expires Ordered    Creatinine Routine  11/15/2019 11/15/2018            Who to contact     If you have questions or need follow up information about today's clinic visit or your schedule please contact [facility] directly at [phone].  Normal or non-critical lab and imaging results will be communicated to you by MyChart, letter or phone within 4 business days after the clinic has received the results. If you do not hear from us within 7 days, please contact the clinic through MyChart or phone. If you have a  "critical or abnormal lab result, we will notify you by phone as soon as possible.  Submit refill requests through [email or web address] or call your pharmacy and they will forward the refill request to us. Please allow 3 business days for your refill to be completed.          Additional Information About Your Visit        [facility] Information     [email or web address] lets you send messages to your doctor, view your test results, renew your prescriptions, schedule appointments and more. To sign up, go to [email or web address] . Click on \"Log in\" on the left side of the screen, which will take you to the Welcome page. Then click on \"Sign up Now\" on the right side of the page.     You will be asked to enter the access code listed below, as well as some personal information. Please follow the directions to create your username and password.     Your access code is: [email or web address]  Expires: 2019  1:05 PM     Your access code will  in 90 days. If you need help or a new code, please call your [facility] clinic or [phone].        Care EveryWhere ID     This is your Care EveryWhere ID. This could be used by other organizations to access your [facility] medical records  [id number]        Your Vitals Were     Pulse Temperature Height Pulse Oximetry BMI (Body Mass Index)       79 97.4  F (36.3  C) (Oral) 6' 1.5\" (1.867 m) 98% 23.95 kg/m2        Blood Pressure from Last 3 Encounters:   11/15/18 138/86   17 126/86   11/15/16 124/76    Weight from Last 3 Encounters:   11/15/18 184 lb (83.5 kg)   17 173 lb (78.5 kg)   11/15/16 166 lb (75.3 kg)              We Performed the Following     GASTROENTEROLOGY ADULT REF PROCEDURE ONLY [facility] [phone]; Dr. DOMINIC Mike        Primary Care Provider Office Phone # Fax #    Dayton Francisco [phone] [address]   [place] [address]        Equal Access to Services     [facility] DONTE AH: Hadii romi Kelsey, [facility] " adilson camara fanny sutton ah. So [facility] [phone].    ATENCIÓN: Si habla tacho, tiene a zhong disposición servicios gratuitos de asistencia lingüística. Llame al [phone].    We comply with applicable federal civil rights laws and [place] laws. We do not discriminate on the basis of race, color, national origin, age, disability, sex, sexual orientation, or gender identity.            Thank you!     Thank you for choosing [facility]  for your care. Our goal is always to provide you with excellent care. Hearing back from our patients is one way we can continue to improve our services. Please take a few minutes to complete the written survey that you may receive in the mail after your visit with us. Thank you!             Your Updated Medication List - Protect others around you: Learn how to safely use, store and throw away your medicines at www.disposemymeds.org.          This list is accurate as of 11/15/18  1:05 PM.  Always use your most recent med list.                   Brand Name Dispense Instructions for use Diagnosis    NO ACTIVE MEDICATIONS

## 2019-10-21 ENCOUNTER — TRANSFERRED RECORDS (OUTPATIENT)
Dept: HEALTH INFORMATION MANAGEMENT | Facility: CLINIC | Age: 69
End: 2019-10-21

## 2019-11-06 ENCOUNTER — DOCUMENTATION ONLY (OUTPATIENT)
Dept: FAMILY MEDICINE | Facility: CLINIC | Age: 69
End: 2019-11-06

## 2019-11-06 DIAGNOSIS — Z12.5 ENCOUNTER FOR SCREENING FOR MALIGNANT NEOPLASM OF PROSTATE: ICD-10-CM

## 2019-11-06 DIAGNOSIS — N18.30 CKD (CHRONIC KIDNEY DISEASE) STAGE 3, GFR 30-59 ML/MIN (H): ICD-10-CM

## 2019-11-06 DIAGNOSIS — Z00.00 ROUTINE GENERAL MEDICAL EXAMINATION AT A HEALTH CARE FACILITY: Primary | ICD-10-CM

## 2019-11-06 DIAGNOSIS — E78.5 HYPERLIPIDEMIA LDL GOAL <160: ICD-10-CM

## 2019-11-06 NOTE — PROGRESS NOTES
There are no orders for this patient for the upcoming lab visit. Please order labs as needed.     Reason for visit: Annual Physical/Wellness Appointment.    Patient requested a UA and PSA.    Thank you, lab.

## 2019-11-12 DIAGNOSIS — Z12.5 ENCOUNTER FOR SCREENING FOR MALIGNANT NEOPLASM OF PROSTATE: ICD-10-CM

## 2019-11-12 DIAGNOSIS — E78.5 HYPERLIPIDEMIA LDL GOAL <160: ICD-10-CM

## 2019-11-12 DIAGNOSIS — Z00.00 ROUTINE GENERAL MEDICAL EXAMINATION AT A HEALTH CARE FACILITY: ICD-10-CM

## 2019-11-12 DIAGNOSIS — N18.30 CKD (CHRONIC KIDNEY DISEASE) STAGE 3, GFR 30-59 ML/MIN (H): ICD-10-CM

## 2019-11-12 LAB
ALBUMIN UR-MCNC: NEGATIVE MG/DL
APPEARANCE UR: CLEAR
BILIRUB UR QL STRIP: NEGATIVE
COLOR UR AUTO: YELLOW
ERYTHROCYTE [DISTWIDTH] IN BLOOD BY AUTOMATED COUNT: 14.2 % (ref 10–15)
GLUCOSE UR STRIP-MCNC: NEGATIVE MG/DL
HCT VFR BLD AUTO: 48.6 % (ref 40–53)
HGB BLD-MCNC: 16 G/DL (ref 13.3–17.7)
HGB UR QL STRIP: ABNORMAL
KETONES UR STRIP-MCNC: 15 MG/DL
LEUKOCYTE ESTERASE UR QL STRIP: NEGATIVE
MCH RBC QN AUTO: 31.6 PG (ref 26.5–33)
MCHC RBC AUTO-ENTMCNC: 32.9 G/DL (ref 31.5–36.5)
MCV RBC AUTO: 96 FL (ref 78–100)
NITRATE UR QL: NEGATIVE
PH UR STRIP: 5.5 PH (ref 5–7)
PLATELET # BLD AUTO: 221 10E9/L (ref 150–450)
RBC # BLD AUTO: 5.06 10E12/L (ref 4.4–5.9)
RBC #/AREA URNS AUTO: NORMAL /HPF
SOURCE: ABNORMAL
SP GR UR STRIP: 1.01 (ref 1–1.03)
UROBILINOGEN UR STRIP-ACNC: 0.2 EU/DL (ref 0.2–1)
WBC # BLD AUTO: 5.2 10E9/L (ref 4–11)
WBC #/AREA URNS AUTO: NORMAL /HPF

## 2019-11-12 PROCEDURE — 36415 COLL VENOUS BLD VENIPUNCTURE: CPT | Performed by: INTERNAL MEDICINE

## 2019-11-12 PROCEDURE — 81001 URINALYSIS AUTO W/SCOPE: CPT | Performed by: INTERNAL MEDICINE

## 2019-11-12 PROCEDURE — 80061 LIPID PANEL: CPT | Performed by: INTERNAL MEDICINE

## 2019-11-12 PROCEDURE — G0103 PSA SCREENING: HCPCS | Performed by: INTERNAL MEDICINE

## 2019-11-12 PROCEDURE — 80053 COMPREHEN METABOLIC PANEL: CPT | Performed by: INTERNAL MEDICINE

## 2019-11-12 PROCEDURE — 85027 COMPLETE CBC AUTOMATED: CPT | Performed by: INTERNAL MEDICINE

## 2019-11-13 LAB
ALBUMIN SERPL-MCNC: 4 G/DL (ref 3.4–5)
ALP SERPL-CCNC: 107 U/L (ref 40–150)
ALT SERPL W P-5'-P-CCNC: 14 U/L (ref 0–70)
ANION GAP SERPL CALCULATED.3IONS-SCNC: 9 MMOL/L (ref 3–14)
AST SERPL W P-5'-P-CCNC: 13 U/L (ref 0–45)
BILIRUB SERPL-MCNC: 1 MG/DL (ref 0.2–1.3)
BUN SERPL-MCNC: 13 MG/DL (ref 7–30)
CALCIUM SERPL-MCNC: 9.1 MG/DL (ref 8.5–10.1)
CHLORIDE SERPL-SCNC: 99 MMOL/L (ref 94–109)
CHOLEST SERPL-MCNC: 239 MG/DL
CO2 SERPL-SCNC: 26 MMOL/L (ref 20–32)
CREAT SERPL-MCNC: 1.32 MG/DL (ref 0.66–1.25)
GFR SERPL CREATININE-BSD FRML MDRD: 55 ML/MIN/{1.73_M2}
GLUCOSE SERPL-MCNC: 93 MG/DL (ref 70–99)
HDLC SERPL-MCNC: 45 MG/DL
LDLC SERPL CALC-MCNC: 166 MG/DL
NONHDLC SERPL-MCNC: 194 MG/DL
POTASSIUM SERPL-SCNC: 4.8 MMOL/L (ref 3.4–5.3)
PROT SERPL-MCNC: 7.4 G/DL (ref 6.8–8.8)
PSA SERPL-ACNC: 6.89 UG/L (ref 0–4)
SODIUM SERPL-SCNC: 134 MMOL/L (ref 133–144)
TRIGL SERPL-MCNC: 141 MG/DL

## 2019-11-19 ENCOUNTER — OFFICE VISIT (OUTPATIENT)
Dept: FAMILY MEDICINE | Facility: CLINIC | Age: 69
End: 2019-11-19
Payer: COMMERCIAL

## 2019-11-19 VITALS
DIASTOLIC BLOOD PRESSURE: 94 MMHG | HEIGHT: 74 IN | WEIGHT: 187 LBS | OXYGEN SATURATION: 97 % | SYSTOLIC BLOOD PRESSURE: 147 MMHG | BODY MASS INDEX: 24 KG/M2 | TEMPERATURE: 97 F | HEART RATE: 79 BPM

## 2019-11-19 DIAGNOSIS — E78.5 HYPERLIPIDEMIA LDL GOAL <160: ICD-10-CM

## 2019-11-19 DIAGNOSIS — N18.30 CKD (CHRONIC KIDNEY DISEASE) STAGE 3, GFR 30-59 ML/MIN (H): ICD-10-CM

## 2019-11-19 DIAGNOSIS — Z00.00 ROUTINE GENERAL MEDICAL EXAMINATION AT A HEALTH CARE FACILITY: Primary | ICD-10-CM

## 2019-11-19 DIAGNOSIS — D12.6 TUBULAR ADENOMA OF COLON: ICD-10-CM

## 2019-11-19 PROCEDURE — 99397 PER PM REEVAL EST PAT 65+ YR: CPT | Performed by: INTERNAL MEDICINE

## 2019-11-19 ASSESSMENT — ACTIVITIES OF DAILY LIVING (ADL): CURRENT_FUNCTION: NO ASSISTANCE NEEDED

## 2019-11-19 ASSESSMENT — MIFFLIN-ST. JEOR: SCORE: 1685.04

## 2019-11-19 NOTE — PATIENT INSTRUCTIONS
The 10-year ASCVD risk score (Karolina SMITH Jr., et al., 2013) is: 21.5%    Values used to calculate the score:      Age: 69 years      Sex: Male      Is Non- : No      Diabetic: No      Tobacco smoker: No      Systolic Blood Pressure: 147 mmHg      Is BP treated: No      HDL Cholesterol: 45 mg/dL      Total Cholesterol: 239 mg/dL      Be sure to do a psa in 4 weeks and let me know the result.  If it is above 4 you should see a urologist.    Let's do a repeat cholesterol when you get back    Please check your blood pressure at least 3x a month, be sure to sit for 5 minutes first, let me know if it is staying over 140/90    Dayton Francisco M.D.

## 2019-11-19 NOTE — PROGRESS NOTES
SUBJECTIVE:   Matias Daugherty is a 69 year old male who presents for Preventive Visit.    The patient is doing very well and exercises regularly.  He is leaving soon for [facility].  He does not want any immunizations.               Past Medical History:      Past Medical History:   Diagnosis Date     Abdominal pain 2004    ct with liver lesion then mri done and felt to be hemangioma     CKD (chronic kidney disease) stage 3, GFR 30-59 ml/min (H)      Hx of colonoscopy 2008    nl     Hypercholesteremia      Liver hemangioma 2004    on above ct     Microhematuria 2004    Dr. 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, retired     Employer: SELF   Social Needs     Financial resource strain: Not on file     Food insecurity:     Worry: Not on file     Inability: Not on file     Transportation needs:     Medical: Not on file     Non-medical: Not on file   Tobacco Use     Smoking status: Never Smoker     Smokeless tobacco: Never Used   Substance and Sexual Activity     Alcohol use: Yes     Alcohol/week: 0.0 standard drinks     Comment: rare     Drug use: No     Sexual activity: Not Currently     Partners: Female   Lifestyle     Physical activity:     Days per week: Not on file     Minutes per session: Not on file     Stress: Not on file   Relationships     Social connections:     Talks on phone: Not on file     Gets together: Not on file     Attends Jewish service: Not on file     Active member of club or organization: Not on file     Attends meetings of clubs or organizations: Not on file  "    Relationship status: Not on file     Intimate partner violence:     Fear of current or ex partner: Not on file     Emotionally abused: Not on file     Physically abused: Not on file     Forced sexual activity: Not on file   Other Topics Concern     Parent/sibling w/ CABG, MI or angioplasty before 65F 55M? Not Asked   Social History Narrative     Not on file             Family History:   reviewed         Allergies:   No Known Allergies          Medications:     Current Outpatient Medications   Medication Sig Dispense Refill     NO ACTIVE MEDICATIONS                  Review of Systems:   The 10 point Review of Systems is negative other than noted in the HPI           Physical Exam:   Blood pressure (!) 147/94, pulse 79, temperature 97  F (36.1  C), temperature source Oral, height 1.867 m (6' 1.5\"), weight 84.8 kg (187 lb), SpO2 97 %.  Blood pressure same bilat  Exam:  Constitutional: healthy appearing, alert and in no distress  Heent: Normocephalic. Head without obvious masses or lesions. PERRLDC, EOMI. Mouth exam within normal limits: tongue, mucous membranes, posterior pharynx all normal, no lesions or abnormalities seen.  Tm's and canals within normal limits bilaterally. Neck supple, no nuchal rigidity or masses. No supraclavicular, or cervical adenopathy. Thyroid symmetric, no masses.  Cardiovascular: Regular rate and rhythm, no murmer, rub or gallops.  JVP not elevated, no edema.  Carotids within normal limits bilaterally, no bruits.  Respiratory: Normal respiratory effort.  Lungs clear, normal flow, no wheezing or crackles.  Breasts: Normal bilaterally.  No masses or lesions.  Nipples within normal limites.  No axillary lesions or nodes.  Gastrointestinal: Normal active bowel sounds.   Soft, not tender, no masses, guarding or rebound.  No hepatosplenomegaly.   Genitourinary: Rectal min benign prostatic hypertrophy, smooth  Musculoskeletal: extremities normal, no gross deformities noted.  Skin: no suspicious " "lesions or rashes   Neurologic: Mental status within normal limits.  Speech fluent.  No gross motor abnormalities and gait intact.  Psychiatric: mentation appears normal and affect normal.         Data:   Labs reviewed with pt        Assessment:   1. Normal complete physical exam  2. Elevated psa, needs close follow up, I explained to the patient that it is very important that we do follow-up on this given the risk of prostate cancer.  I would like him to follow-up on it in 4 weeks.  He will be in [place] and assures me that he can do it down there.  If it remains elevated he will need to see urology understands all this.  3. Elevated blood pressure, ?real, needs good control, he will check it and call if up  4. Ckd, stable  5. Elevated cholesterol, I strongly rec adding statin, he does not want  6. Colon polyp  7. hcm         Plan:   Refuses immunizations  Monitor blood pressure and if up call  Exercise, diet  psa 4 weeks      Dayton Francisco M.D.            Are you in the first 12 months of your Medicare coverage?  No    Healthy Habits:    In general, how would you rate your overall health?  Very good    Frequency of exercise:  6-7 days/week    Duration of exercise:  30-45 minutes    Do you usually eat at least 4 servings of fruit and vegetables a day, include whole grains    & fiber and avoid regularly eating high fat or \"junk\" foods?  Yes    Taking medications regularly:  Yes    Barriers to taking medications:  Not applicable    Medication side effects:  Not applicable    Ability to successfully perform activities of daily living:  No assistance needed    Home Safety:  No safety concerns identified    Hearing Impairment:  No hearing concerns    In the past 6 months, have you been bothered by leaking of urine?  No    In general, how would you rate your overall mental or emotional health?  Excellent      PHQ-2 Total Score:    Additional concerns today:  No    Do you feel safe in your environment? Yes    Have you " ever done Advance Care Planning? (For example, a Health Directive, POLST, or a discussion with a medical provider or your loved ones about your wishes): No, advance care planning information given to patient to review.  Patient plans to discuss their wishes with loved ones or provider.        Fall risk       Cognitive Screening   1) Repeat 3 items (Leader, Season, Table)    2) Clock draw: NORMAL  3) 3 item recall: Recalls 3 objects  Results: 3 items recalled: COGNITIVE IMPAIRMENT LESS LIKELY    Mini-CogTM Copyright S Naomi. Licensed by the author for use in [facility]; reprinted with permission ([email or web address]). All rights reserved.      Do you have sleep apnea, excessive snoring or daytime drowsiness?: no    Reviewed and updated as needed this visit by clinical staff         Reviewed and updated as needed this visit by Provider        Social History     Tobacco Use     Smoking status: Never Smoker     Smokeless tobacco: Never Used   Substance Use Topics     Alcohol use: Yes     Alcohol/week: 0.0 standard drinks     Comment: rare     If you drink alcohol do you typically have >3 drinks per day or >7 drinks per week? No    Alcohol Use 11/16/2017   Prescreen: >3 drinks/day or >7 drinks/week? The patient does not drink >3 drinks per day nor >7 drinks per week.               Current providers sharing in care for this patient include:   Patient Care Team:  Dayton Francisco MD as PCP - General (Internal Medicine)  Dayton Francisco MD as Assigned PCP    The following health maintenance items are reviewed in Epic and correct as of today:  Health Maintenance   Topic Date Due     ZOSTER IMMUNIZATION (1 of 2) 09/19/2002     PNEUMOCOCCAL IMMUNIZATION 65+ LOW/MEDIUM RISK (1 of 2 - PCV13) 09/19/2017     AORTIC ANEURYSM SCREENING (SYSTEM ASSIGNED)  09/19/2017     ADVANCE CARE PLANNING  11/01/2017     COLONOSCOPY  10/31/2018     PHQ-2  01/01/2019     INFLUENZA VACCINE (1) 09/01/2019     FALL RISK ASSESSMENT   "11/15/2019     MEDICARE ANNUAL WELLNESS VISIT  11/15/2019     LIPID  11/12/2024     DTAP/TDAP/TD IMMUNIZATION (4 - Td) 11/15/2028     HEPATITIS C SCREENING  Completed     IPV IMMUNIZATION  Aged Out     MENINGITIS IMMUNIZATION  Aged Out           Review of Systems      OBJECTIVE:   There were no vitals taken for this visit. Estimated body mass index is 23.95 kg/m  as calculated from the following:    Height as of 11/15/18: 1.867 m (6' 1.5\").    Weight as of 11/15/18: 83.5 kg (184 lb).  Physical Exam          ASSESSMENT / PLAN:       COUNSELING:  Reviewed preventive health counseling, as reflected in patient instructions       Regular exercise       Healthy diet/nutrition    Estimated body mass index is 23.95 kg/m  as calculated from the following:    Height as of 11/15/18: 1.867 m (6' 1.5\").    Weight as of 11/15/18: 83.5 kg (184 lb).         reports that he has never smoked. He has never used smokeless tobacco.      Appropriate preventive services were discussed with this patient, including applicable screening as appropriate for cardiovascular disease, diabetes, osteopenia/osteoporosis, and glaucoma.  As appropriate for age/gender, discussed screening for colorectal cancer, prostate cancer, breast cancer, and cervical cancer. Checklist reviewing preventive services available has been given to the patient.    Reviewed patients plan of care and provided an AVS. The Basic Care Plan (routine screening as documented in Health Maintenance) for Matias meets the Care Plan requirement. This Care Plan has been established and reviewed with the Patient.    Counseling Resources:  ATP IV Guidelines  Pooled Cohorts Equation Calculator  Breast Cancer Risk Calculator  FRAX Risk Assessment  ICSI Preventive Guidelines  Dietary Guidelines for Americans, 2010  USDA's MyPlate  ASA Prophylaxis  Lung CA Screening    Dayton Francisco MD  [facility]    Identified Health Risks:  "

## 2019-11-21 ENCOUNTER — TELEPHONE (OUTPATIENT)
Dept: FAMILY MEDICINE | Facility: CLINIC | Age: 69
End: 2019-11-21

## 2019-11-21 DIAGNOSIS — Z12.5 SCREENING FOR PROSTATE CANCER: Primary | ICD-10-CM

## 2019-11-21 NOTE — TELEPHONE ENCOUNTER
Had a PSA done on 11/12 and he really wants another read on it to make sure it is correct    He really wants to get another one done before he goes and that will determine if he goes to [place]  He wants to come in on the 26th or 27th  He said it is really for his peace of mind    If any questions or concerns please call him and he said he feels very strongly about having another test   He would really like to talk to Dr Francisco about his concerns if he could call him at   [phone]

## 2019-11-21 NOTE — TELEPHONE ENCOUNTER
"Pt reports one more concern and is very concerned that this could be related to PSAs.    Pain on the outside of his Right leg, hip area.   Thought it was just regular muscle pain/comes and goes/but now concerned it might be something else. If I lie in a certain position, leg is flat.   \"Not sharp, feels painful when I move it, if I'm lying down flat on my back for a while, and then I raise my knee, that's when it feels bad until I stand up. Not a dull or sharp pain, I just feel like I don't want to put pressure on it, and then I walk upstairs, and that leg feels weak and extra pain, but if I let it rest it can get better.  Feels like pressure almost.\"      Denies all s/sx blood clots such as swelling, redness, heat, etc in hip.      Please advise if any concern related to PSA/pt concerned for bone matos?  Shama Sterling RN       "

## 2019-11-21 NOTE — TELEPHONE ENCOUNTER
Reason for Call:  Lab orders     Detailed comments:   Pt just recently had an annual physical with Dr. Francisco.  His PSA came back unsatisfactory and the pt is asking if another order for labs concerning his PSA can be placed?  Please inform pt if this is possible.    It was mentioned that he needed another test concerning his PSA.  He is asking for a PSA test and also the one suggested.      Pt is also asking if there is someone that can be a  to give some guidance for insurance?

## 2019-11-21 NOTE — TELEPHONE ENCOUNTER
PCP,    Please see message. Pt would like you to call him regarding his concerns. Also, he would like to have PSA done again prior to leaving for [place].    Thank you,  Hernán JOVEL RN

## 2019-11-21 NOTE — TELEPHONE ENCOUNTER
The psa should be done mid December, and I think he will be in [place].  He needs to make sure to get it done there.    Not sure about financial question    Dayton Francisco M.D.

## 2019-11-22 NOTE — TELEPHONE ENCOUNTER
I am not at all concerned re this and the psa, but if ongoing should be evaluated, sounds like may be bursitis    Dayton Francisco M.D.

## 2019-11-26 DIAGNOSIS — Z12.5 SCREENING FOR PROSTATE CANCER: ICD-10-CM

## 2019-11-26 PROCEDURE — 36415 COLL VENOUS BLD VENIPUNCTURE: CPT | Performed by: INTERNAL MEDICINE

## 2019-11-26 PROCEDURE — G0103 PSA SCREENING: HCPCS | Performed by: INTERNAL MEDICINE

## 2019-11-27 LAB — PSA SERPL-ACNC: 3.9 UG/L (ref 0–4)

## 2019-11-28 NOTE — RESULT ENCOUNTER NOTE
Great news, the psa is back down.  Let me know if you have questions but I think we can follow up on this in a year.    Dayton Francisco M.D.

## 2020-02-08 ENCOUNTER — HEALTH MAINTENANCE LETTER (OUTPATIENT)
Age: 70
End: 2020-02-08

## 2020-11-08 ENCOUNTER — HEALTH MAINTENANCE LETTER (OUTPATIENT)
Age: 70
End: 2020-11-08

## 2021-01-15 ENCOUNTER — HEALTH MAINTENANCE LETTER (OUTPATIENT)
Age: 71
End: 2021-01-15

## 2021-02-05 ENCOUNTER — TELEPHONE (OUTPATIENT)
Dept: FAMILY MEDICINE | Facility: CLINIC | Age: 71
End: 2021-02-05

## 2021-02-05 DIAGNOSIS — N18.30 STAGE 3 CHRONIC KIDNEY DISEASE, UNSPECIFIED WHETHER STAGE 3A OR 3B CKD (H): ICD-10-CM

## 2021-02-05 DIAGNOSIS — Z00.00 ROUTINE GENERAL MEDICAL EXAMINATION AT A HEALTH CARE FACILITY: Primary | ICD-10-CM

## 2021-02-05 DIAGNOSIS — Z12.5 SCREENING FOR PROSTATE CANCER: ICD-10-CM

## 2021-02-05 NOTE — TELEPHONE ENCOUNTER
Leave for Dr. Francisco    Pt last seen 11/2019  Pt is down in [place], has been for a year due to Covid.  Is wondering if he can get yearly labs. Unsure when will be back, but hopefully soon.    Requesting PSA, cholesterol, Kidney tests.  Pended regular labs for review.      Call back to pt, ok to leave detailed msg if he doesn't answer. He will double check fax number for us to send labs to.   [phone]  Terri Galvez

## 2021-02-08 NOTE — TELEPHONE ENCOUNTER
Informed pt.  Need to print fax orders to [phone].   Shama Sterling, RN  [facility] RN Triage Team

## 2021-06-24 DIAGNOSIS — N18.30 STAGE 3 CHRONIC KIDNEY DISEASE, UNSPECIFIED WHETHER STAGE 3A OR 3B CKD (H): ICD-10-CM

## 2021-06-24 DIAGNOSIS — E78.5 HYPERLIPIDEMIA LDL GOAL <160: ICD-10-CM

## 2021-06-24 DIAGNOSIS — Z00.00 MEDICARE ANNUAL WELLNESS VISIT, SUBSEQUENT: ICD-10-CM

## 2021-06-24 LAB
ALBUMIN SERPL-MCNC: 3.9 G/DL (ref 3.4–5)
ALBUMIN UR-MCNC: 30 MG/DL
ALP SERPL-CCNC: 109 U/L (ref 40–150)
ALT SERPL W P-5'-P-CCNC: 25 U/L (ref 0–70)
ANION GAP SERPL CALCULATED.3IONS-SCNC: 2 MMOL/L (ref 3–14)
APPEARANCE UR: CLEAR
AST SERPL W P-5'-P-CCNC: 17 U/L (ref 0–45)
BACTERIA #/AREA URNS HPF: ABNORMAL /HPF
BILIRUB SERPL-MCNC: 0.9 MG/DL (ref 0.2–1.3)
BILIRUB UR QL STRIP: ABNORMAL
BUN SERPL-MCNC: 18 MG/DL (ref 7–30)
CALCIUM SERPL-MCNC: 9.3 MG/DL (ref 8.5–10.1)
CHLORIDE SERPL-SCNC: 105 MMOL/L (ref 94–109)
CHOLEST SERPL-MCNC: 235 MG/DL
CO2 SERPL-SCNC: 28 MMOL/L (ref 20–32)
COLOR UR AUTO: YELLOW
CREAT SERPL-MCNC: 1.61 MG/DL (ref 0.66–1.25)
ERYTHROCYTE [DISTWIDTH] IN BLOOD BY AUTOMATED COUNT: 14.4 % (ref 10–15)
GFR SERPL CREATININE-BSD FRML MDRD: 43 ML/MIN/{1.73_M2}
GLUCOSE SERPL-MCNC: 90 MG/DL (ref 70–99)
GLUCOSE UR STRIP-MCNC: NEGATIVE MG/DL
HCT VFR BLD AUTO: 50.1 % (ref 40–53)
HDLC SERPL-MCNC: 47 MG/DL
HGB BLD-MCNC: 17 G/DL (ref 13.3–17.7)
HGB UR QL STRIP: NEGATIVE
KETONES UR STRIP-MCNC: 40 MG/DL
LDLC SERPL CALC-MCNC: 164 MG/DL
LEUKOCYTE ESTERASE UR QL STRIP: NEGATIVE
MCH RBC QN AUTO: 32.2 PG (ref 26.5–33)
MCHC RBC AUTO-ENTMCNC: 33.9 G/DL (ref 31.5–36.5)
MCV RBC AUTO: 95 FL (ref 78–100)
MUCOUS THREADS #/AREA URNS LPF: PRESENT /LPF
NITRATE UR QL: NEGATIVE
NONHDLC SERPL-MCNC: 188 MG/DL
PH UR STRIP: 5.5 PH (ref 5–7)
PLATELET # BLD AUTO: 196 10E9/L (ref 150–450)
POTASSIUM SERPL-SCNC: 5.1 MMOL/L (ref 3.4–5.3)
PROT SERPL-MCNC: 7.9 G/DL (ref 6.8–8.8)
RBC # BLD AUTO: 5.28 10E12/L (ref 4.4–5.9)
RBC #/AREA URNS AUTO: ABNORMAL /HPF
SODIUM SERPL-SCNC: 135 MMOL/L (ref 133–144)
SOURCE: ABNORMAL
SP GR UR STRIP: >1.03 (ref 1–1.03)
TRIGL SERPL-MCNC: 119 MG/DL
UROBILINOGEN UR STRIP-ACNC: 0.2 EU/DL (ref 0.2–1)
WBC # BLD AUTO: 5.1 10E9/L (ref 4–11)
WBC #/AREA URNS AUTO: ABNORMAL /HPF

## 2021-06-24 PROCEDURE — 80061 LIPID PANEL: CPT | Performed by: INTERNAL MEDICINE

## 2021-06-24 PROCEDURE — 81001 URINALYSIS AUTO W/SCOPE: CPT | Performed by: INTERNAL MEDICINE

## 2021-06-24 PROCEDURE — 80053 COMPREHEN METABOLIC PANEL: CPT | Performed by: INTERNAL MEDICINE

## 2021-06-24 PROCEDURE — 36415 COLL VENOUS BLD VENIPUNCTURE: CPT | Performed by: INTERNAL MEDICINE

## 2021-06-24 PROCEDURE — G0103 PSA SCREENING: HCPCS | Performed by: INTERNAL MEDICINE

## 2021-06-24 PROCEDURE — 85027 COMPLETE CBC AUTOMATED: CPT | Performed by: INTERNAL MEDICINE

## 2021-06-25 LAB — PSA SERPL-ACNC: 4.2 UG/L (ref 0–4)

## 2021-06-25 ASSESSMENT — ENCOUNTER SYMPTOMS
CONSTIPATION: 0
FEVER: 0
ABDOMINAL PAIN: 0
PALPITATIONS: 0
DIARRHEA: 0
NAUSEA: 0
NERVOUS/ANXIOUS: 0
JOINT SWELLING: 0
MYALGIAS: 0
HEARTBURN: 0
ARTHRALGIAS: 0
WEAKNESS: 0
HEMATURIA: 0
PARESTHESIAS: 0
FREQUENCY: 0
HEADACHES: 0
SHORTNESS OF BREATH: 0
CHILLS: 0
SORE THROAT: 0
COUGH: 0
DIZZINESS: 0
DYSURIA: 0
HEMATOCHEZIA: 0
EYE PAIN: 0

## 2021-06-25 ASSESSMENT — ACTIVITIES OF DAILY LIVING (ADL): CURRENT_FUNCTION: NO ASSISTANCE NEEDED

## 2021-07-01 ENCOUNTER — OFFICE VISIT (OUTPATIENT)
Dept: FAMILY MEDICINE | Facility: CLINIC | Age: 71
End: 2021-07-01
Payer: MEDICARE

## 2021-07-01 VITALS
HEIGHT: 73 IN | DIASTOLIC BLOOD PRESSURE: 82 MMHG | TEMPERATURE: 97 F | OXYGEN SATURATION: 97 % | HEART RATE: 81 BPM | SYSTOLIC BLOOD PRESSURE: 124 MMHG | WEIGHT: 180 LBS | BODY MASS INDEX: 23.86 KG/M2

## 2021-07-01 DIAGNOSIS — D12.6 TUBULAR ADENOMA OF COLON: ICD-10-CM

## 2021-07-01 DIAGNOSIS — R97.20 ELEVATED PROSTATE SPECIFIC ANTIGEN (PSA): ICD-10-CM

## 2021-07-01 DIAGNOSIS — Z00.00 MEDICARE ANNUAL WELLNESS VISIT, SUBSEQUENT: Primary | ICD-10-CM

## 2021-07-01 DIAGNOSIS — E78.5 HYPERLIPIDEMIA LDL GOAL <160: ICD-10-CM

## 2021-07-01 DIAGNOSIS — N18.31 STAGE 3A CHRONIC KIDNEY DISEASE (H): ICD-10-CM

## 2021-07-01 DIAGNOSIS — Z12.5 ENCOUNTER FOR SCREENING FOR MALIGNANT NEOPLASM OF PROSTATE: ICD-10-CM

## 2021-07-01 LAB
ALBUMIN UR-MCNC: NEGATIVE MG/DL
APPEARANCE UR: CLEAR
BACTERIA #/AREA URNS HPF: ABNORMAL /HPF
BILIRUB UR QL STRIP: NEGATIVE
CAOX CRY #/AREA URNS HPF: ABNORMAL /HPF
COLOR UR AUTO: YELLOW
GLUCOSE UR STRIP-MCNC: NEGATIVE MG/DL
HGB UR QL STRIP: ABNORMAL
HYALINE CASTS #/AREA URNS LPF: ABNORMAL /LPF
KETONES UR STRIP-MCNC: 15 MG/DL
LEUKOCYTE ESTERASE UR QL STRIP: NEGATIVE
MUCOUS THREADS #/AREA URNS LPF: PRESENT /LPF
NITRATE UR QL: NEGATIVE
PH UR STRIP: 5.5 PH (ref 5–7)
RBC #/AREA URNS AUTO: ABNORMAL /HPF
SOURCE: ABNORMAL
SP GR UR STRIP: 1.01 (ref 1–1.03)
UROBILINOGEN UR STRIP-ACNC: 0.2 EU/DL (ref 0.2–1)
WBC #/AREA URNS AUTO: ABNORMAL /HPF

## 2021-07-01 PROCEDURE — 69209 REMOVE IMPACTED EAR WAX UNI: CPT | Performed by: INTERNAL MEDICINE

## 2021-07-01 PROCEDURE — 80061 LIPID PANEL: CPT | Performed by: INTERNAL MEDICINE

## 2021-07-01 PROCEDURE — 99214 OFFICE O/P EST MOD 30 MIN: CPT | Mod: 25 | Performed by: INTERNAL MEDICINE

## 2021-07-01 PROCEDURE — 36415 COLL VENOUS BLD VENIPUNCTURE: CPT | Performed by: INTERNAL MEDICINE

## 2021-07-01 PROCEDURE — 81001 URINALYSIS AUTO W/SCOPE: CPT | Performed by: INTERNAL MEDICINE

## 2021-07-01 PROCEDURE — G0439 PPPS, SUBSEQ VISIT: HCPCS | Performed by: INTERNAL MEDICINE

## 2021-07-01 PROCEDURE — 82565 ASSAY OF CREATININE: CPT | Performed by: INTERNAL MEDICINE

## 2021-07-01 ASSESSMENT — ACTIVITIES OF DAILY LIVING (ADL): CURRENT_FUNCTION: NO ASSISTANCE NEEDED

## 2021-07-01 ASSESSMENT — MIFFLIN-ST. JEOR: SCORE: 1640.35

## 2021-07-01 NOTE — PROGRESS NOTES
Patient identified using two patient identifiers.  Ear exam showing wax occlusion completed by provider.  Solution: warm water was placed in the bilateral ear(s) via irrigation tool: elephant ear   Outcome: wax cleared of both ears patient tolerated well. Yessi Sylvester CMA  .

## 2021-07-01 NOTE — PATIENT INSTRUCTIONS
The 10-year ASCVD risk score (Karolina SMITH Jr., et al., 2013) is: 19.1%    Values used to calculate the score:      Age: 70 years      Sex: Male      Is Non- : No      Diabetic: No      Tobacco smoker: No      Systolic Blood Pressure: 134 mmHg      Is BP treated: No      HDL Cholesterol: 47 mg/dL      Total Cholesterol: 235 mg/dL

## 2021-07-01 NOTE — PROGRESS NOTES
SUBJECTIVE:   Matias Daugherty is a 70 year old male who presents for Preventive Visit.    Overall he is doing quite well.  He does work out fairly regularly.  He has no significant complaints on review of systems.  He wants to get the ears flushed today               Past Medical History:      Past Medical History:   Diagnosis Date     Abdominal pain 2004    ct with liver lesion then mri done and felt to be hemangioma     CKD (chronic kidney disease) stage 3, GFR 30-59 ml/min      Elevated prostate specific antigen (PSA) 02/2021     Hx of colonoscopy 2008    nl     Hypercholesteremia      Liver hemangioma 2004    on above ct     Microhematuria 2004    Dr. 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, retired     Employer: SELF   Social Needs     Financial resource strain: Not on file     Food insecurity     Worry: Not on file     Inability: Not on file     Transportation needs     Medical: Not on file     Non-medical: Not on file   Tobacco Use     Smoking status: Never Smoker     Smokeless tobacco: Never Used   Substance and Sexual Activity     Alcohol use: Yes     Alcohol/week: 0.0 standard drinks     Comment: rare     Drug use: No     Sexual activity: Not Currently     Partners: Female   Lifestyle     Physical activity     Days per week: Not on file     Minutes per session: Not on file     Stress: Not on file   Relationships     Social connections     Talks on phone: Not on file     Gets together: Not on file     Attends Anabaptism service: Not on file     Active member of club or organization:  "Not on file     Attends meetings of clubs or organizations: Not on file     Relationship status: Not on file     Intimate partner violence     Fear of current or ex partner: Not on file     Emotionally abused: Not on file     Physically abused: Not on file     Forced sexual activity: Not on file   Other Topics Concern     Parent/sibling w/ CABG, MI or angioplasty before 65F 55M? Not Asked   Social History Narrative     Not on file             Family History:   reviewed         Allergies:   No Known Allergies          Medications:     Current Outpatient Medications   Medication Sig Dispense Refill     NO ACTIVE MEDICATIONS                  Review of Systems:   The 10 point Review of Systems is negative other than noted in the HPI           Physical Exam:   Blood pressure 124/82, pulse 81, temperature 97  F (36.1  C), temperature source Oral, height 1.854 m (6' 1\"), weight 81.6 kg (180 lb), SpO2 97 %.    Exam:  Constitutional: healthy appearing, alert and in no distress  Heent: Normocephalic. Head without obvious masses or lesions. PERRLDC, EOMI. Mouth exam within normal limits: tongue, mucous membranes, posterior pharynx all normal, no lesions or abnormalities seen.  Tm's and canals wax occluding bilat. Neck supple, no nuchal rigidity or masses. No supraclavicular, or cervical adenopathy. Thyroid symmetric, no masses.  Cardiovascular: Regular rate and rhythm, no murmer, rub or gallops.  JVP not elevated, no edema.  Carotids within normal limits bilaterally, no bruits.  Respiratory: Normal respiratory effort.  Lungs clear, normal flow, no wheezing or crackles.  Breasts: Normal bilaterally.  No masses or lesions.  Nipples within normal limites.  No axillary lesions or nodes.  Gastrointestinal: Normal active bowel sounds.   Soft, not tender, no masses, guarding or rebound.  No hepatosplenomegaly.   Genitourinary: Rectal min bph  Musculoskeletal: extremities normal, no gross deformities noted.  Skin: no suspicious " "lesions or rashes   Neurologic: Mental status within normal limits.  Speech fluent.  No gross motor abnormalities and gait intact.  Psychiatric: mentation appears normal and affect normal.         Data:   Labs reviewed with patient, others sent        Assessment:   1. Normal complete physical exam  2. Elevated psa, this is been a very gradual rise for many years so I do not think it represents a malignant cause.  I did suggest the patient a repeat PSA in 4 months however and he will do this.  3. Ckd, a bit higher now so to repeat it  4. Abnormal ua, repeat it  5. Elevated cholesterol, dlw patient and I rec statin, he wants to wait  6. Colon polyp, up to date on follow up  7. hcm         Plan:   Flush the wax  Repeat labs  Exercise, diet  Does not want pneumovax  shingrix at pharm  Up to date colon      Dayton Francisco M.D.            Patient has been advised of split billing requirements and indicates understanding: Yes   Are you in the first 12 months of your Medicare coverage?  No    Healthy Habits:     In general, how would you rate your overall health?  Good    Frequency of exercise:  6-7 days/week    Duration of exercise:  30-45 minutes    Do you usually eat at least 4 servings of fruit and vegetables a day, include whole grains    & fiber and avoid regularly eating high fat or \"junk\" foods?  No    Taking medications regularly:  Not Applicable    Ability to successfully perform activities of daily living:  No assistance needed    Home Safety:  No safety concerns identified    Hearing Impairment:  No hearing concerns    In the past 6 months, have you been bothered by leaking of urine?  No    In general, how would you rate your overall mental or emotional health?  Good      PHQ-2 Total Score: 0    Additional concerns today:  No    Do you feel safe in your environment?yes    Have you ever done Advance Care Planning? (For example, a Health Directive, POLST, or a discussion with a medical provider or your loved ones " about your wishes): No, advance care planning information given to patient to review.  Patient plans to discuss their wishes with loved ones or provider.         Fall risk       Cognitive Screening   1) Repeat 3 items (Leader, Season, Table)    2) Clock draw: NORMAL  3) 3 item recall: Recalls 3 objects  Results: 3 items recalled: COGNITIVE IMPAIRMENT LESS LIKELY    Mini-CogTM Copyright JAYLIN Salgado. Licensed by the author for use in [facility]; reprinted with permission ([email or web address]). All rights reserved.      Do you have sleep apnea, excessive snoring or daytime drowsiness?: no    Reviewed and updated as needed this visit by clinical staff                 Reviewed and updated as needed this visit by Provider                Social History     Tobacco Use     Smoking status: Never Smoker     Smokeless tobacco: Never Used   Substance Use Topics     Alcohol use: Yes     Alcohol/week: 0.0 standard drinks     Comment: rare     If you drink alcohol do you typically have >3 drinks per day or >7 drinks per week? No    Alcohol Use 6/25/2021   Prescreen: >3 drinks/day or >7 drinks/week? No   Prescreen: >3 drinks/day or >7 drinks/week? -               Current providers sharing in care for this patient include:   Patient Care Team:  Dayton Francisco MD as PCP - General (Internal Medicine)  Dayton Francisco MD as Assigned PCP    The following health maintenance items are reviewed in Epic and correct as of today:  Health Maintenance Due   Topic Date Due     ANNUAL REVIEW OF  ORDERS  Never done     COVID-19 Vaccine (1) Never done     ZOSTER IMMUNIZATION (1 of 2) Never done     Pneumococcal Vaccine: 65+ Years (1 of 1 - PPSV23) Never done     AORTIC ANEURYSM SCREENING (SYSTEM ASSIGNED)  Never done     MEDICARE ANNUAL WELLNESS VISIT  11/19/2020     FALL RISK ASSESSMENT  11/19/2020         Any new diagnosis of family breast, ovarian, or bowel cancer?     FHS-7: No flowsheet data found.      Pertinent mammograms  "are reviewed under the imaging tab.    Review of Systems      OBJECTIVE:   There were no vitals taken for this visit. Estimated body mass index is 24.34 kg/m  as calculated from the following:    Height as of 11/19/19: 1.867 m (6' 1.5\").    Weight as of 11/19/19: 84.8 kg (187 lb).  Physical Exam          ASSESSMENT / PLAN:       Patient has been advised of split billing requirements and indicates understanding: No  COUNSELING:  Reviewed preventive health counseling, as reflected in patient instructions       Regular exercise       Healthy diet/nutrition    Estimated body mass index is 24.34 kg/m  as calculated from the following:    Height as of 11/19/19: 1.867 m (6' 1.5\").    Weight as of 11/19/19: 84.8 kg (187 lb).        He reports that he has never smoked. He has never used smokeless tobacco.      Appropriate preventive services were discussed with this patient, including applicable screening as appropriate for cardiovascular disease, diabetes, osteopenia/osteoporosis, and glaucoma.  As appropriate for age/gender, discussed screening for colorectal cancer, prostate cancer, breast cancer, and cervical cancer. Checklist reviewing preventive services available has been given to the patient.    Reviewed patients plan of care and provided an AVS. The Basic Care Plan (routine screening as documented in Health Maintenance) for Matias meets the Care Plan requirement. This Care Plan has been established and reviewed with the Patient.    Counseling Resources:  ATP IV Guidelines  Pooled Cohorts Equation Calculator  Breast Cancer Risk Calculator  Breast Cancer: Medication to Reduce Risk  FRAX Risk Assessment  ICSI Preventive Guidelines  Dietary Guidelines for Americans, 2010  [employer]'s MyPlate  ASA Prophylaxis  Lung CA Screening    Dayton Francisco MD  [facility]    Identified Health Risks:  "

## 2021-07-02 LAB
CHOLEST SERPL-MCNC: 198 MG/DL
CREAT SERPL-MCNC: 1.51 MG/DL (ref 0.66–1.25)
GFR SERPL CREATININE-BSD FRML MDRD: 47 ML/MIN/{1.73_M2}
HDLC SERPL-MCNC: 55 MG/DL
LDLC SERPL CALC-MCNC: 123 MG/DL
NONHDLC SERPL-MCNC: 143 MG/DL
TRIGL SERPL-MCNC: 98 MG/DL

## 2021-07-09 NOTE — RESULT ENCOUNTER NOTE
Mr. Daugherty,    I am sorry for the delay in getting back to you.  As you can see the kidney test or creatinine is slightly improved which is great.  The urine is stable.    Your cholesterol is lower which is nice to see.    At this point there is nothing further to do but to be safe I would like you to come back and repeat the urine and kidney tests in about 4 months.  Please be sure to do this.    If you have any questions let me know.    Dayton Francisco M.D.

## 2021-09-11 ENCOUNTER — HEALTH MAINTENANCE LETTER (OUTPATIENT)
Age: 71
End: 2021-09-11

## 2021-11-10 ENCOUNTER — LAB (OUTPATIENT)
Dept: LAB | Facility: CLINIC | Age: 71
End: 2021-11-10
Payer: MEDICARE

## 2021-11-10 DIAGNOSIS — R97.20 ELEVATED PROSTATE SPECIFIC ANTIGEN (PSA): ICD-10-CM

## 2021-11-10 DIAGNOSIS — N18.31 STAGE 3A CHRONIC KIDNEY DISEASE (H): ICD-10-CM

## 2021-11-10 DIAGNOSIS — Z12.5 ENCOUNTER FOR SCREENING FOR MALIGNANT NEOPLASM OF PROSTATE: ICD-10-CM

## 2021-11-10 LAB
ALBUMIN UR-MCNC: NEGATIVE MG/DL
APPEARANCE UR: CLEAR
BILIRUB UR QL STRIP: NEGATIVE
COLOR UR AUTO: YELLOW
CREAT SERPL-MCNC: 1.32 MG/DL (ref 0.66–1.25)
GFR SERPL CREATININE-BSD FRML MDRD: 55 ML/MIN/1.73M2
GLUCOSE UR STRIP-MCNC: NEGATIVE MG/DL
HGB UR QL STRIP: NEGATIVE
KETONES UR STRIP-MCNC: NEGATIVE MG/DL
LEUKOCYTE ESTERASE UR QL STRIP: NEGATIVE
NITRATE UR QL: NEGATIVE
PH UR STRIP: 5.5 [PH] (ref 5–7)
PSA SERPL-MCNC: 4.21 UG/L (ref 0–4)
SP GR UR STRIP: 1.01 (ref 1–1.03)
UROBILINOGEN UR STRIP-ACNC: 0.2 E.U./DL

## 2021-11-10 PROCEDURE — 81003 URINALYSIS AUTO W/O SCOPE: CPT

## 2021-11-10 PROCEDURE — 36415 COLL VENOUS BLD VENIPUNCTURE: CPT

## 2021-11-10 PROCEDURE — G0103 PSA SCREENING: HCPCS

## 2021-11-10 PROCEDURE — 82565 ASSAY OF CREATININE: CPT

## 2021-11-11 NOTE — RESULT ENCOUNTER NOTE
Mr. Daugherty,    As you can see the urine is now clear and the creatinine or kidney test is back down, both good news.  The psa is stable which is also good.  Let's repeat these at your next complete physical exam.    If you have any questions please call me.    Dayton Francisco M.D.

## 2022-01-03 ENCOUNTER — TRANSFERRED RECORDS (OUTPATIENT)
Dept: HEALTH INFORMATION MANAGEMENT | Facility: CLINIC | Age: 72
End: 2022-01-03
Payer: MEDICARE

## 2022-01-04 ENCOUNTER — TELEPHONE (OUTPATIENT)
Dept: FAMILY MEDICINE | Facility: CLINIC | Age: 72
End: 2022-01-04
Payer: MEDICARE

## 2022-01-04 NOTE — TELEPHONE ENCOUNTER
Patient calling to give provider update on health.     Patient currently in [place]. Has had headaches for past 2 weeks, went to a Neurologist who recommended patient to have MRI and MRA. Patient will update provider with results once this is completed.

## 2022-01-28 ENCOUNTER — VIRTUAL VISIT (OUTPATIENT)
Dept: FAMILY MEDICINE | Facility: CLINIC | Age: 72
End: 2022-01-28
Payer: MEDICARE

## 2022-01-28 DIAGNOSIS — R51.9 PERSISTENT HEADACHES: Primary | ICD-10-CM

## 2022-01-28 DIAGNOSIS — N18.31 STAGE 3A CHRONIC KIDNEY DISEASE (H): ICD-10-CM

## 2022-01-28 PROCEDURE — 99442 PR PHYSICIAN TELEPHONE EVALUATION 11-20 MIN: CPT | Mod: 95 | Performed by: INTERNAL MEDICINE

## 2022-02-01 NOTE — TELEPHONE ENCOUNTER
FUTURE VISIT INFORMATION      FUTURE VISIT INFORMATION:    Date: 2/4/2022    Time: 2pm    Location: [facility]  REFERRAL INFORMATION:    Referring provider:  Dr. Francisco    Referring providers clinic:  [facility]     Reason for visit/diagnosis  Headaches     RECORDS REQUESTED FROM:       Clinic name Comments Records Status Imaging Status   Internal Dr. Francisco-1/28/2022 [facility] N/A          [facility]   Requested  N/A                        2/1/2022-Called and Spoke with patient and he told me he was seen at [facility]. Request for records faxed to [facility]-MR @ 204pm    2/3/2022-[facility] Records scanned to Chart-MR @ 515am

## 2022-02-04 ENCOUNTER — PRE VISIT (OUTPATIENT)
Dept: NEUROLOGY | Facility: CLINIC | Age: 72
End: 2022-02-04

## 2022-02-04 ENCOUNTER — VIRTUAL VISIT (OUTPATIENT)
Dept: NEUROLOGY | Facility: CLINIC | Age: 72
End: 2022-02-04
Attending: INTERNAL MEDICINE
Payer: MEDICARE

## 2022-02-04 DIAGNOSIS — G44.85 STABBING HEADACHE: ICD-10-CM

## 2022-02-04 DIAGNOSIS — G50.8 OTHER DISORDERS OF TRIGEMINAL NERVE: ICD-10-CM

## 2022-02-04 DIAGNOSIS — R51.9 PERSISTENT HEADACHES: ICD-10-CM

## 2022-02-04 DIAGNOSIS — G44.52 NEW DAILY PERSISTENT HEADACHE: ICD-10-CM

## 2022-02-04 DIAGNOSIS — G43.709 CHRONIC MIGRAINE WITHOUT AURA WITHOUT STATUS MIGRAINOSUS, NOT INTRACTABLE: ICD-10-CM

## 2022-02-04 DIAGNOSIS — R51.9 ACUTE INTRACTABLE HEADACHE, UNSPECIFIED HEADACHE TYPE: Primary | ICD-10-CM

## 2022-02-04 PROCEDURE — 99204 OFFICE O/P NEW MOD 45 MIN: CPT | Mod: 95 | Performed by: PSYCHIATRY & NEUROLOGY

## 2022-02-04 ASSESSMENT — HEADACHE IMPACT TEST (HIT 6)
HOW OFTEN DID HEADACHS LIMIT CONCENTRATION ON WORK OR DAILY ACTIVITY: NEVER
HOW OFTEN DO HEADACHES LIMIT YOUR DAILY ACTIVITIES: NEVER
HIT6 TOTAL SCORE: 45
HOW OFTEN DID HEADACHS LIMIT CONCENTRATION ON WORK OR DAILY ACTIVITY: NEVER
HOW OFTEN DO HEADACHES LIMIT YOUR DAILY ACTIVITIES: NEVER
WHEN YOU HAVE HEADACHES HOW OFTEN IS THE PAIN SEVERE: NEVER
HIT6 TOTAL SCORE: 45
HOW OFTEN HAVE YOU FELT FED UP OR IRRITATED BECAUSE OF YOUR HEADACHES: ALWAYS
HOW OFTEN HAVE YOU FELT FED UP OR IRRITATED BECAUSE OF YOUR HEADACHES: ALWAYS
WHEN YOU HAVE A HEADACHE HOW OFTEN DO YOU WISH YOU COULD LIE DOWN: RARELY
WHEN YOU HAVE A HEADACHE HOW OFTEN DO YOU WISH YOU COULD LIE DOWN: RARELY
HOW OFTEN HAVE YOU FELT TOO TIRED TO WORK BECAUSE OF YOUR HEADACHES: NEVER
WHEN YOU HAVE HEADACHES HOW OFTEN IS THE PAIN SEVERE: NEVER
HOW OFTEN HAVE YOU FELT TOO TIRED TO WORK BECAUSE OF YOUR HEADACHES: NEVER

## 2022-02-04 NOTE — PATIENT INSTRUCTIONS
Imaging Center Scheduling  Please contact us to schedule your imaging visit at any of our [facility] Locations  [facility]  (All Imaging Center locations)  Phone: [phone]    [facility]  ([facility], [facility], [facility])  Phone: [phone]    [facility]  ([facility], [facility])  Phone: [phone]    [facility]   Phone: [phone]

## 2022-02-04 NOTE — LETTER
2/4/2022       RE: Matias Daugherty  [address]  [place] [address]     Dear Colleague,    Thank you for referring your patient, Matias Daugherty, to the [facility] [place] [facility]. Please see a copy of my visit note below.    Xavier is a 71 year old who is being evaluated via a billable video visit.      How would you like to obtain your AVS? MyChart  If the video visit is dropped, the invitation should be resent by: Send to e-mail at: [email or web address]  Will anyone else be joining your video visit? No      Video Start Time: 2:03 PM  Video-Visit Details    Type of service:  Video Visit    Video End Time:2:52 PM    Originating Location (pt. Location): Home    Distant Location (provider location):  [facility] NEUROLOGY [facility]     Platform used for Video Visit: [employer]     MIGRAINE DISABILITY ASSESSMENT (MIDAS)    On how many days in the last 3 months did you miss work or school because of your headaches?  0    Last Patient-Answered HIT-6 Questionnaire      How many days in the last 3 months was your productivity at work or school reduced by half or more because of your headaches? (Do not include days you counted in question 1 where you missed work or school.)  0    On how many days in the last 3 months did you not do household work (such as housework, home repairs and maintenance, shopping, caring for children and relatives) because of your headaches?  0    How many days in the last 3 months was your productivity in household work reduced by half or more because of your headaches? (Do not include days you counted in question 3 where you did not do household work).  0    On how many days in the last 3 months did you miss family, social, or lesiure activities because of your headaches?  0    MIDAS Total Score: 0    On how many days in the last 3 months did you have a headache? (If a headache lasted more than 1  day, count each day.)   45    On a scale of 0 - 10, on average how painful were these headaches (where 0 = no pain at all, and 10 = pain as bad as it can be.)  6    Last Patient-Answered HIT-6 Questionnaire  HIT-6 2/4/2022   When you have headaches, how often is the pain severe 6   How often do headaches limit your ability to do usual daily activities including household work, work, school, or social activities? 6   When you have a headache, how often do you wish you could lie down? 8   In the past 4 weeks, how often have you felt too tired to do work or daily activities because of your headaches 6   In the past 4 weeks, how often have you felt fed up or irritated because of your headaches 13   In the past 4 weeks, how often did headaches limit your ability to concentrate on work or daily activities 6   HIT-6 Total Score 45     [facility] [place]  Virtual Neurology Consult     Matias Daugherty MRN# [id number]   YOB: 1950 Age: 71 year old     Requesting physician: Dayton Francisco MD         Assessment and Recommendations:     Matias Daugherty is a 71 year old male who presents for further evaluation of headache.    His headache presentation shows new daily persistent headaches since December 2021, with overlying stabbing headaches.  While he has had headaches in the past, this is new and different for him, and with his age, raises concern for other underlying pathology.    His virtual neurologic examination is intact today, which is somewhat reassuring.  -I recommend further evaluation with an MRI of the brain and MRA and MRV of the head for further evaluation of secondary causes.  -I also recommend ESR and CRP lab work to evaluate for inflammatory markers can be associated with some headache types, particularly temporal arteritis.    Depending on the results of imaging, additional testing may be indicated.  He reports that he has not had any  "concerning findings on recent eye exams.    I will plan to see him back as needed.  I will contact him if the results change our plan.    If testing is unrevealing, then I recommend moving forward with a symptomatic treatment strategy for new daily persistent headache and primary stabbing headache with preventative treatment.    Anastasiia Landaverde MD  Neurology            Chief Complaint:     Chief Complaint   Patient presents with     Consult     Headaches, started up mid December, Never a day without a symptoms. Had a couple of streaks of no pain but pretty consistent.            History is obtained from the patient and medical record.  Patient was seen via a virtual visit in their home due to the Covid 19 global pandemic.      Matias Daugherty is a 71 year old male who has been living with headaches since child.     He has had exertional headaches since 15 years ago. This started after trying to open a window. It is a sharp pain that is brief. This recurs with opening jars, bearing down.     He's also had \"migraines\" in the past. He took Excedrin on an empty stomach for these, but had rebound headaches and stopped. With severe headaches, he needed to lay down. Routine physical activity could exacerbate them. They occurred 1-2 times per week. These haven't occurred for the past 15 years.    More recently, in early December 2021, he had two exertional headaches in a row (weight lifting) and then a third one started in the second week of December, and hasn't gone away.    He was seen by an NP in [place], [place]. MRI, MRA was recommended, but he was concerned about his kidney function and the gadolinium.     The last 5-7 days, the headaches have been better, but not resolved.     His headaches can occur anywhere on his head and can move around.  He has worsening over the top of his head bilaterally when bending over. The background pain rates 2/10, with 6-10/10 when severe. The pain is pulsing when severe and lasts " momentarily but can be repeated.     He denies photophobia, phonophobia, nausea, vomiting. He denies typical aura.     He tried Excedrin recently, without benefit.             Past Medical History:     Past Medical History:   Diagnosis Date     Abdominal pain 2004    ct with liver lesion then mri done and felt to be hemangioma     CKD (chronic kidney disease) stage 3, GFR 30-59 ml/min (H)      Elevated prostate specific antigen (PSA) 02/2021     Hx of colonoscopy 2008    nl     Hypercholesteremia      Liver hemangioma 2004    on above ct     Microhematuria 2004    Dr. Jameson, cysto and ct, ct as above     Tubular adenoma of colon 10/2019    2mm              Past Surgical History:     Past Surgical History:   Procedure Laterality Date     NO HISTORY OF SURGERY               Social History:   He doesn't drink caffeine.     He tries to eat healthy, with fruits and vegetables.     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 Not Asked   Social History Narrative     Not on file     Social Determinants of Health     Financial Resource Strain: Not on file   Food Insecurity: Not on file   Transportation Needs: Not on file   Physical Activity: Not on file   Stress: Not on file   Social Connections: Not on file   Intimate Partner Violence: Not on file   Housing Stability: Not on file             Family History:   No fam hx of headaches.   Family History    Problem Relation Age of Onset     Prostate Cancer Father              Allergies:    No Known Allergies          Medications:     Current Outpatient Medications:      NO ACTIVE MEDICATIONS, , Disp: , Rfl:           Physical Exam:   There were no vitals taken for this visit.   Physical Exam:   General: NAD  Neurologic:   Mental Status Exam: Alert, awake and oriented to situation. No dysarthria. Speech of normal fluency.   Cranial Nerves: Pupils equal, EOMs intact, no nystagmus, facial movements symmetric, hearing intact to conversation, tongue midline and fully mobile. No tongue atrophy or fasciculations.    Motor: No drift in upper extremities. Able to stand from a seated position without use of arms. No tremors or abnormal movements noted.   Coordination: With arms outstretched, able to touch nose using index finger accurately bilaterally (1st try on the right, 2nd try on the left).  Normal finger tapping bilaterally.     Gait: Normal stance and casual gait.    Neck: Normal range of motion with lateral head movements and neck flexion.  Eyes: No conjunctival injection, no scleral icterus.           Data:     No previous head imaging.          Again, thank you for allowing me to participate in the care of your patient.      Sincerely,    Anastasiia Landaverde MD

## 2022-02-04 NOTE — PROGRESS NOTES
Xavier is a 71 year old who is being evaluated via a billable video visit.      How would you like to obtain your AVS? MyChart  If the video visit is dropped, the invitation should be resent by: Send to e-mail at: [email or web address]  Will anyone else be joining your video visit? No      Video Start Time: 2:03 PM  Video-Visit Details    Type of service:  Video Visit    Video End Time:2:52 PM    Originating Location (pt. Location): Home    Distant Location (provider location):  [facility] NEUROLOGY [facility]     Platform used for Video Visit: [facility]     MIGRAINE DISABILITY ASSESSMENT (MIDAS)    On how many days in the last 3 months did you miss work or school because of your headaches?  0    Last Patient-Answered HIT-6 Questionnaire      How many days in the last 3 months was your productivity at work or school reduced by half or more because of your headaches? (Do not include days you counted in question 1 where you missed work or school.)  0    On how many days in the last 3 months did you not do household work (such as housework, home repairs and maintenance, shopping, caring for children and relatives) because of your headaches?  0    How many days in the last 3 months was your productivity in household work reduced by half or more because of your headaches? (Do not include days you counted in question 3 where you did not do household work).  0    On how many days in the last 3 months did you miss family, social, or lesiure activities because of your headaches?  0    MIDAS Total Score: 0    On how many days in the last 3 months did you have a headache? (If a headache lasted more than 1 day, count each day.)   45    On a scale of 0 - 10, on average how painful were these headaches (where 0 = no pain at all, and 10 = pain as bad as it can be.)  6    Last Patient-Answered HIT-6 Questionnaire  HIT-6 2/4/2022   When you have headaches, how often is the pain severe 6   How often do headaches limit your  ability to do usual daily activities including household work, work, school, or social activities? 6   When you have a headache, how often do you wish you could lie down? 8   In the past 4 weeks, how often have you felt too tired to do work or daily activities because of your headaches 6   In the past 4 weeks, how often have you felt fed up or irritated because of your headaches 13   In the past 4 weeks, how often did headaches limit your ability to concentrate on work or daily activities 6   HIT-6 Total Score 45     [facility] [place]  Virtual Neurology Consult     Matias Daugherty MRN# [id number]   YOB: 1950 Age: 71 year old     Requesting physician: Dayotn Francisco MD         Assessment and Recommendations:     Matias Daugherty is a 71 year old male who presents for further evaluation of headache.    His headache presentation shows new daily persistent headaches since December 2021, with overlying stabbing headaches.  While he has had headaches in the past, this is new and different for him, and with his age, raises concern for other underlying pathology.    His virtual neurologic examination is intact today, which is somewhat reassuring.  -I recommend further evaluation with an MRI of the brain and MRA and MRV of the head for further evaluation of secondary causes.  -I also recommend ESR and CRP lab work to evaluate for inflammatory markers can be associated with some headache types, particularly temporal arteritis.    Depending on the results of imaging, additional testing may be indicated.  He reports that he has not had any concerning findings on recent eye exams.    I will plan to see him back as needed.  I will contact him if the results change our plan.    If testing is unrevealing, then I recommend moving forward with a symptomatic treatment strategy for new daily persistent headache and primary stabbing headache with preventative  "treatment.    Anastasiia Landaverde MD  Neurology            Chief Complaint:     Chief Complaint   Patient presents with     Consult     Headaches, started up mid December, Never a day without a symptoms. Had a couple of streaks of no pain but pretty consistent.            History is obtained from the patient and medical record.  Patient was seen via a virtual visit in their home due to the Covid 19 global pandemic.      Matias Daugherty is a 71 year old male who has been living with headaches since child.     He has had exertional headaches since 15 years ago. This started after trying to open a window. It is a sharp pain that is brief. This recurs with opening jars, bearing down.     He's also had \"migraines\" in the past. He took Excedrin on an empty stomach for these, but had rebound headaches and stopped. With severe headaches, he needed to lay down. Routine physical activity could exacerbate them. They occurred 1-2 times per week. These haven't occurred for the past 15 years.    More recently, in early December 2021, he had two exertional headaches in a row (weight lifting) and then a third one started in the second week of December, and hasn't gone away.    He was seen by an NP in [place], [place]. MRI, MRA was recommended, but he was concerned about his kidney function and the gadolinium.     The last 5-7 days, the headaches have been better, but not resolved.     His headaches can occur anywhere on his head and can move around.  He has worsening over the top of his head bilaterally when bending over. The background pain rates 2/10, with 6-10/10 when severe. The pain is pulsing when severe and lasts momentarily but can be repeated.     He denies photophobia, phonophobia, nausea, vomiting. He denies typical aura.     He tried Excedrin recently, without benefit.             Past Medical History:     Past Medical History:   Diagnosis Date     Abdominal pain 2004    ct with liver lesion then mri done and felt to be " hemangioma     CKD (chronic kidney disease) stage 3, GFR 30-59 ml/min (H)      Elevated prostate specific antigen (PSA) 02/2021     Hx of colonoscopy 2008    nl     Hypercholesteremia      Liver hemangioma 2004    on above ct     Microhematuria 2004    Dr. Jameson, cysto and ct, ct as above     Tubular adenoma of colon 10/2019    2mm              Past Surgical History:     Past Surgical History:   Procedure Laterality Date     NO HISTORY OF SURGERY               Social History:   He doesn't drink caffeine.     He tries to eat healthy, with fruits and vegetables.     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 Not Asked   Social History Narrative     Not on file     Social Determinants of Health     Financial Resource Strain: Not on file   Food Insecurity: Not on file   Transportation Needs: Not on file   Physical Activity: Not on file   Stress: Not on file   Social Connections: Not on file   Intimate Partner Violence: Not on file   Housing Stability: Not on file             Family History:   No fam hx of headaches.   Family History   Problem Relation Age of Onset     Prostate Cancer Father              Allergies:    No Known Allergies          Medications:     Current Outpatient Medications:      NO ACTIVE MEDICATIONS, , Disp: , Rfl:           Physical Exam:   There were no vitals taken for this visit.   Physical Exam:   General:  NAD  Neurologic:   Mental Status Exam: Alert, awake and oriented to situation. No dysarthria. Speech of normal fluency.   Cranial Nerves: Pupils equal, EOMs intact, no nystagmus, facial movements symmetric, hearing intact to conversation, tongue midline and fully mobile. No tongue atrophy or fasciculations.    Motor: No drift in upper extremities. Able to stand from a seated position without use of arms. No tremors or abnormal movements noted.   Coordination: With arms outstretched, able to touch nose using index finger accurately bilaterally (1st try on the right, 2nd try on the left).  Normal finger tapping bilaterally.     Gait: Normal stance and casual gait.    Neck: Normal range of motion with lateral head movements and neck flexion.  Eyes: No conjunctival injection, no scleral icterus.           Data:     No previous head imaging.

## 2022-02-07 ENCOUNTER — LAB (OUTPATIENT)
Dept: LAB | Facility: CLINIC | Age: 72
End: 2022-02-07
Payer: MEDICARE

## 2022-02-07 DIAGNOSIS — G44.52 NEW DAILY PERSISTENT HEADACHE: ICD-10-CM

## 2022-02-07 DIAGNOSIS — R51.9 ACUTE INTRACTABLE HEADACHE, UNSPECIFIED HEADACHE TYPE: ICD-10-CM

## 2022-02-07 DIAGNOSIS — G44.85 STABBING HEADACHE: ICD-10-CM

## 2022-02-07 LAB
CREAT SERPL-MCNC: 1.38 MG/DL (ref 0.66–1.25)
CRP SERPL-MCNC: <2.9 MG/L (ref 0–8)
ERYTHROCYTE [SEDIMENTATION RATE] IN BLOOD BY WESTERGREN METHOD: 8 MM/HR (ref 0–20)
GFR SERPL CREATININE-BSD FRML MDRD: 55 ML/MIN/1.73M2

## 2022-02-07 PROCEDURE — 85652 RBC SED RATE AUTOMATED: CPT

## 2022-02-07 PROCEDURE — 82565 ASSAY OF CREATININE: CPT

## 2022-02-07 PROCEDURE — 36415 COLL VENOUS BLD VENIPUNCTURE: CPT

## 2022-02-07 PROCEDURE — 86140 C-REACTIVE PROTEIN: CPT

## 2022-02-20 ENCOUNTER — ANCILLARY PROCEDURE (OUTPATIENT)
Dept: MRI IMAGING | Facility: CLINIC | Age: 72
End: 2022-02-20
Attending: PSYCHIATRY & NEUROLOGY
Payer: MEDICARE

## 2022-02-20 DIAGNOSIS — R51.9 ACUTE INTRACTABLE HEADACHE, UNSPECIFIED HEADACHE TYPE: ICD-10-CM

## 2022-02-20 DIAGNOSIS — G50.8 OTHER DISORDERS OF TRIGEMINAL NERVE: ICD-10-CM

## 2022-02-20 DIAGNOSIS — G44.85 STABBING HEADACHE: ICD-10-CM

## 2022-02-20 DIAGNOSIS — G44.52 NEW DAILY PERSISTENT HEADACHE: ICD-10-CM

## 2022-02-20 PROCEDURE — A9585 GADOBUTROL INJECTION: HCPCS | Performed by: RADIOLOGY

## 2022-02-20 PROCEDURE — G1004 CDSM NDSC: HCPCS | Mod: GC | Performed by: RADIOLOGY

## 2022-02-20 PROCEDURE — 99207 MRA BRAIN (CIRCLE OF WILLIS) WO CONTRAST: CPT | Mod: GC | Performed by: RADIOLOGY

## 2022-02-20 PROCEDURE — 70546 MR ANGIOGRAPH HEAD W/O&W/DYE: CPT | Mod: MG | Performed by: RADIOLOGY

## 2022-02-20 PROCEDURE — 99207 MR BRAIN W/O & W CONTRAST: CPT | Mod: GC | Performed by: RADIOLOGY

## 2022-02-20 RX ORDER — GADOBUTROL 604.72 MG/ML
10 INJECTION INTRAVENOUS ONCE
Status: COMPLETED | OUTPATIENT
Start: 2022-02-20 | End: 2022-02-20

## 2022-02-20 RX ADMIN — GADOBUTROL 10 ML: 604.72 INJECTION INTRAVENOUS at 10:16

## 2022-02-20 NOTE — DISCHARGE INSTRUCTIONS
MRI Contrast Discharge Instructions    The IV contrast you received today will pass out of your body in your  urine. This will happen in the next 24 hours. You will not feel this process.  Your urine will not change color.    Drink at least 4 extra glasses of water or juice today (unless your doctor  has restricted your fluids). This reduces the stress on your kidneys.  You may take your regular medicines.    If you are on dialysis: It is best to have dialysis today.    If you have a reaction: Most reactions happen right away. If you have  any new symptoms after leaving the hospital (such as hives or swelling),  call your hospital at the correct number below. Or call your family doctor.  If you have breathing distress or wheezing, call 911.    Special instructions: ***    I have read and understand the above information.    Signature:______________________________________ Date:___________    Staff:__________________________________________ Date:___________     Time:__________    [facility] Departments:    ___[place]: [phone]  ___[facility]: [phone]  ___[place]: [phone] ___[facility]: [phone]  ___[facility]: [phone]  ___[facility]: [phone]  ___[phone]  ___[facility]: [phone]  ___[phone]

## 2022-02-20 NOTE — DISCHARGE INSTRUCTIONS
MRI Contrast Discharge Instructions    The IV contrast you received today will pass out of your body in your  urine. This will happen in the next 24 hours. You will not feel this process.  Your urine will not change color.    Drink at least 4 extra glasses of water or juice today (unless your doctor  has restricted your fluids). This reduces the stress on your kidneys.  You may take your regular medicines.    If you are on dialysis: It is best to have dialysis today.    If you have a reaction: Most reactions happen right away. If you have  any new symptoms after leaving the hospital (such as hives or swelling),  call your hospital at the correct number below. Or call your family doctor.  If you have breathing distress or wheezing, call 911.    Special instructions: ***    I have read and understand the above information.    Signature:______________________________________ Date:___________    Staff:__________________________________________ Date:___________     Time:__________    [facility] Departments:    ___[place]: [phone]  ___[facility]: [phone]  ___[place]: [phone] ___[facility]: [phone]  ___[facility]: [phone]  ___[facility]: [phone]  ___[phone]  ___[facility]: [phone]  ___[phone]

## 2022-02-22 DIAGNOSIS — G44.52 NEW DAILY PERSISTENT HEADACHE: Primary | ICD-10-CM

## 2022-02-22 DIAGNOSIS — R90.89 ABNORMAL FINDING ON MRI OF BRAIN: ICD-10-CM

## 2022-03-02 ENCOUNTER — ANCILLARY PROCEDURE (OUTPATIENT)
Dept: MRI IMAGING | Facility: CLINIC | Age: 72
End: 2022-03-02
Attending: PSYCHIATRY & NEUROLOGY
Payer: MEDICARE

## 2022-03-02 DIAGNOSIS — G44.52 NEW DAILY PERSISTENT HEADACHE: ICD-10-CM

## 2022-03-02 DIAGNOSIS — R90.89 ABNORMAL FINDING ON MRI OF BRAIN: ICD-10-CM

## 2022-03-02 PROCEDURE — 72148 MRI LUMBAR SPINE W/O DYE: CPT | Mod: TC | Performed by: RADIOLOGY

## 2022-03-02 PROCEDURE — 72146 MRI CHEST SPINE W/O DYE: CPT | Mod: TC | Performed by: RADIOLOGY

## 2022-03-02 PROCEDURE — 72141 MRI NECK SPINE W/O DYE: CPT | Mod: TC | Performed by: RADIOLOGY

## 2022-03-08 DIAGNOSIS — G44.52 NEW DAILY PERSISTENT HEADACHE: Primary | ICD-10-CM

## 2022-03-08 DIAGNOSIS — R90.89 ABNORMAL FINDING ON MRI OF BRAIN: ICD-10-CM

## 2022-08-13 ENCOUNTER — HEALTH MAINTENANCE LETTER (OUTPATIENT)
Age: 72
End: 2022-08-13

## 2022-09-04 ENCOUNTER — ANCILLARY PROCEDURE (OUTPATIENT)
Dept: MRI IMAGING | Facility: CLINIC | Age: 72
End: 2022-09-04
Attending: PSYCHIATRY & NEUROLOGY
Payer: MEDICARE

## 2022-09-04 DIAGNOSIS — G96.811 INTRACRANIAL HYPOTENSION, SPONTANEOUS: ICD-10-CM

## 2022-09-04 PROCEDURE — A9585 GADOBUTROL INJECTION: HCPCS | Performed by: RADIOLOGY

## 2022-09-04 PROCEDURE — 70553 MRI BRAIN STEM W/O & W/DYE: CPT | Mod: GC | Performed by: RADIOLOGY

## 2022-09-04 RX ORDER — GADOBUTROL 604.72 MG/ML
10 INJECTION INTRAVENOUS ONCE
Status: COMPLETED | OUTPATIENT
Start: 2022-09-04 | End: 2022-09-04

## 2022-09-04 RX ADMIN — GADOBUTROL 8 ML: 604.72 INJECTION INTRAVENOUS at 14:15

## 2022-09-04 NOTE — DISCHARGE INSTRUCTIONS
MRI Contrast Discharge Instructions    The IV contrast you received today will pass out of your body in your  urine. This will happen in the next 24 hours. You will not feel this process.  Your urine will not change color.    Drink at least 4 extra glasses of water or juice today (unless your doctor  has restricted your fluids). This reduces the stress on your kidneys.  You may take your regular medicines.    If you are on dialysis: It is best to have dialysis today.    If you have a reaction: Most reactions happen right away. If you have  any new symptoms after leaving the hospital (such as hives or swelling),  call your hospital at the correct number below. Or call your family doctor.  If you have breathing distress or wheezing, call 911.    Special instructions: ***    I have read and understand the above information.    Signature:______________________________________ Date:___________    Staff:__________________________________________ Date:___________     Time:__________    [place] [facility] Departments:    ___[place]: [phone]  ___[facility]: [phone]  ___[place]: [phone] ___[facility]: [phone]  ___[facility]: [phone]  ___[facility]: [phone]  ___[phone]  ___[facility]: [phone]  ___[phone]

## 2022-10-30 ENCOUNTER — HEALTH MAINTENANCE LETTER (OUTPATIENT)
Age: 72
End: 2022-10-30

## 2022-11-02 ENCOUNTER — DOCUMENTATION ONLY (OUTPATIENT)
Dept: LAB | Facility: CLINIC | Age: 72
End: 2022-11-02

## 2022-11-02 DIAGNOSIS — N18.31 STAGE 3A CHRONIC KIDNEY DISEASE (H): ICD-10-CM

## 2022-11-02 DIAGNOSIS — Z00.00 MEDICARE ANNUAL WELLNESS VISIT, SUBSEQUENT: Primary | ICD-10-CM

## 2022-11-02 DIAGNOSIS — Z12.5 ENCOUNTER FOR SCREENING FOR MALIGNANT NEOPLASM OF PROSTATE: ICD-10-CM

## 2022-11-02 DIAGNOSIS — E78.5 HYPERLIPIDEMIA LDL GOAL <160: ICD-10-CM

## 2022-11-02 DIAGNOSIS — R97.20 ELEVATED PROSTATE SPECIFIC ANTIGEN (PSA): ICD-10-CM

## 2022-11-02 NOTE — PROGRESS NOTES
Patient has upcoming lab appt next week.  No orders in chart.  Please place orders if applicable. Kathy Davis CMA/lab

## 2022-11-04 ENCOUNTER — TELEPHONE (OUTPATIENT)
Dept: FAMILY MEDICINE | Facility: CLINIC | Age: 72
End: 2022-11-04

## 2022-11-04 DIAGNOSIS — N18.31 STAGE 3A CHRONIC KIDNEY DISEASE (H): Primary | ICD-10-CM

## 2022-11-04 NOTE — TELEPHONE ENCOUNTER
Dr. Art, patient is asking for U/A to be added for his upcoming lab appointment 11/10/2022.     Exam with you 11/14/2022.     He states the U/A is checked every six months .    Order pended if you want to approve.     No UTI symptoms. Last U/A was normal.     Tash Khan RN  [facility]

## 2022-11-10 ENCOUNTER — LAB (OUTPATIENT)
Dept: LAB | Facility: CLINIC | Age: 72
End: 2022-11-10
Payer: MEDICARE

## 2022-11-10 DIAGNOSIS — Z12.5 ENCOUNTER FOR SCREENING FOR MALIGNANT NEOPLASM OF PROSTATE: ICD-10-CM

## 2022-11-10 DIAGNOSIS — E78.5 HYPERLIPIDEMIA LDL GOAL <160: ICD-10-CM

## 2022-11-10 DIAGNOSIS — R97.20 ELEVATED PROSTATE SPECIFIC ANTIGEN (PSA): ICD-10-CM

## 2022-11-10 DIAGNOSIS — N18.31 STAGE 3A CHRONIC KIDNEY DISEASE (H): ICD-10-CM

## 2022-11-10 DIAGNOSIS — Z00.00 MEDICARE ANNUAL WELLNESS VISIT, SUBSEQUENT: ICD-10-CM

## 2022-11-10 LAB
ALBUMIN SERPL-MCNC: 3.8 G/DL (ref 3.4–5)
ALBUMIN UR-MCNC: NEGATIVE MG/DL
ALP SERPL-CCNC: 92 U/L (ref 40–150)
ALT SERPL W P-5'-P-CCNC: 16 U/L (ref 0–70)
ANION GAP SERPL CALCULATED.3IONS-SCNC: 4 MMOL/L (ref 3–14)
APPEARANCE UR: CLEAR
AST SERPL W P-5'-P-CCNC: 12 U/L (ref 0–45)
BACTERIA #/AREA URNS HPF: NORMAL /HPF
BILIRUB SERPL-MCNC: 1.1 MG/DL (ref 0.2–1.3)
BILIRUB UR QL STRIP: NEGATIVE
BUN SERPL-MCNC: 17 MG/DL (ref 7–30)
CALCIUM SERPL-MCNC: 8.9 MG/DL (ref 8.5–10.1)
CHLORIDE BLD-SCNC: 107 MMOL/L (ref 94–109)
CHOLEST SERPL-MCNC: 225 MG/DL
CO2 SERPL-SCNC: 28 MMOL/L (ref 20–32)
COLOR UR AUTO: YELLOW
CREAT SERPL-MCNC: 1.43 MG/DL (ref 0.66–1.25)
ERYTHROCYTE [DISTWIDTH] IN BLOOD BY AUTOMATED COUNT: 14.8 % (ref 10–15)
FASTING STATUS PATIENT QL REPORTED: YES
GFR SERPL CREATININE-BSD FRML MDRD: 53 ML/MIN/1.73M2
GLUCOSE BLD-MCNC: 93 MG/DL (ref 70–99)
GLUCOSE UR STRIP-MCNC: NEGATIVE MG/DL
HCT VFR BLD AUTO: 47.4 % (ref 40–53)
HDLC SERPL-MCNC: 49 MG/DL
HGB BLD-MCNC: 15.7 G/DL (ref 13.3–17.7)
HGB UR QL STRIP: ABNORMAL
KETONES UR STRIP-MCNC: 15 MG/DL
LDLC SERPL CALC-MCNC: 155 MG/DL
LEUKOCYTE ESTERASE UR QL STRIP: NEGATIVE
MCH RBC QN AUTO: 31.1 PG (ref 26.5–33)
MCHC RBC AUTO-ENTMCNC: 33.1 G/DL (ref 31.5–36.5)
MCV RBC AUTO: 94 FL (ref 78–100)
NITRATE UR QL: NEGATIVE
NONHDLC SERPL-MCNC: 176 MG/DL
PH UR STRIP: 6 [PH] (ref 5–7)
PLATELET # BLD AUTO: 236 10E3/UL (ref 150–450)
POTASSIUM BLD-SCNC: 4.3 MMOL/L (ref 3.4–5.3)
PROT SERPL-MCNC: 7.3 G/DL (ref 6.8–8.8)
PSA SERPL-MCNC: 4.49 UG/L (ref 0–4)
RBC # BLD AUTO: 5.05 10E6/UL (ref 4.4–5.9)
RBC #/AREA URNS AUTO: NORMAL /HPF
SODIUM SERPL-SCNC: 139 MMOL/L (ref 133–144)
SP GR UR STRIP: 1.02 (ref 1–1.03)
TRIGL SERPL-MCNC: 103 MG/DL
UROBILINOGEN UR STRIP-ACNC: 1 E.U./DL
WBC # BLD AUTO: 5.3 10E3/UL (ref 4–11)
WBC #/AREA URNS AUTO: NORMAL /HPF

## 2022-11-10 PROCEDURE — 80053 COMPREHEN METABOLIC PANEL: CPT

## 2022-11-10 PROCEDURE — 85027 COMPLETE CBC AUTOMATED: CPT

## 2022-11-10 PROCEDURE — G0103 PSA SCREENING: HCPCS

## 2022-11-10 PROCEDURE — 81001 URINALYSIS AUTO W/SCOPE: CPT

## 2022-11-10 PROCEDURE — 36415 COLL VENOUS BLD VENIPUNCTURE: CPT

## 2022-11-10 PROCEDURE — 80061 LIPID PANEL: CPT

## 2022-11-21 ASSESSMENT — ENCOUNTER SYMPTOMS
HEARTBURN: 0
PALPITATIONS: 0
FREQUENCY: 0
SHORTNESS OF BREATH: 0
JOINT SWELLING: 0
HEMATOCHEZIA: 0
CHILLS: 0
DIZZINESS: 0
WEAKNESS: 0
DYSURIA: 0
EYE PAIN: 0
COUGH: 0
PARESTHESIAS: 0
ARTHRALGIAS: 0
HEMATURIA: 0
MYALGIAS: 0
HEADACHES: 0
DIARRHEA: 0
FEVER: 0
NAUSEA: 0
SORE THROAT: 0
CONSTIPATION: 0
ABDOMINAL PAIN: 0
NERVOUS/ANXIOUS: 0

## 2022-11-21 ASSESSMENT — ACTIVITIES OF DAILY LIVING (ADL): CURRENT_FUNCTION: NO ASSISTANCE NEEDED

## 2022-11-28 ENCOUNTER — OFFICE VISIT (OUTPATIENT)
Dept: FAMILY MEDICINE | Facility: CLINIC | Age: 72
End: 2022-11-28
Payer: MEDICARE

## 2022-11-28 VITALS
HEIGHT: 73 IN | WEIGHT: 186 LBS | RESPIRATION RATE: 16 BRPM | TEMPERATURE: 97.7 F | BODY MASS INDEX: 24.65 KG/M2 | HEART RATE: 75 BPM | SYSTOLIC BLOOD PRESSURE: 124 MMHG | DIASTOLIC BLOOD PRESSURE: 86 MMHG

## 2022-11-28 DIAGNOSIS — R97.20 ELEVATED PROSTATE SPECIFIC ANTIGEN (PSA): ICD-10-CM

## 2022-11-28 DIAGNOSIS — N18.31 STAGE 3A CHRONIC KIDNEY DISEASE (H): ICD-10-CM

## 2022-11-28 DIAGNOSIS — D12.6 TUBULAR ADENOMA OF COLON: ICD-10-CM

## 2022-11-28 DIAGNOSIS — Z00.00 MEDICARE ANNUAL WELLNESS VISIT, SUBSEQUENT: Primary | ICD-10-CM

## 2022-11-28 DIAGNOSIS — E78.5 HYPERLIPIDEMIA LDL GOAL <160: ICD-10-CM

## 2022-11-28 PROCEDURE — 99214 OFFICE O/P EST MOD 30 MIN: CPT | Mod: 25 | Performed by: INTERNAL MEDICINE

## 2022-11-28 PROCEDURE — G0439 PPPS, SUBSEQ VISIT: HCPCS | Performed by: INTERNAL MEDICINE

## 2022-11-28 ASSESSMENT — ACTIVITIES OF DAILY LIVING (ADL): CURRENT_FUNCTION: NO ASSISTANCE NEEDED

## 2022-11-28 ASSESSMENT — PAIN SCALES - GENERAL: PAINLEVEL: NO PAIN (0)

## 2022-11-28 NOTE — PATIENT INSTRUCTIONS
The 10-year ASCVD risk score (Raman ZAMORA, et al., 2019) is: 18.3%    Values used to calculate the score:      Age: 72 years      Sex: Male      Is Non- : No      Diabetic: No      Tobacco smoker: No      Systolic Blood Pressure: 124 mmHg      Is BP treated: No      HDL Cholesterol: 49 mg/dL      Total Cholesterol: 225 mg/dL    Please call to schedule the coronary ct scan if they do not call you in the next week.  Please call cardiology at [phone] to schedule your procedure.

## 2022-11-28 NOTE — PROGRESS NOTES
SUBJECTIVE:   Xavier is a 72 year old who presents for Preventive Visit.    Patient overall is doing well and he does workout regularly.  He has been having headaches and has had an evaluation here and at the [facility] and they seem to be improving.  He otherwise has no complaints.               Past Medical History:      Past Medical History:   Diagnosis Date     Abdominal pain 2004    ct with liver lesion then mri done and felt to be hemangioma     CKD (chronic kidney disease) stage 3, GFR 30-59 ml/min (H)      Elevated prostate specific antigen (PSA) 02/2021     Hx of colonoscopy 2008    nl     Hypercholesteremia      Hypertension Not sure    Sometimes not     Liver hemangioma 2004    on above ct     Microhematuria 2004    Dr. Jameson, cysto and ct, ct as above     Tubular adenoma of colon 10/2019    2mm             Past Surgical History:      Past Surgical History:   Procedure Laterality Date     COLONOSCOPY  2 years ago?    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 No   Social History Narrative     Not on file     Social Determinants of Health     Financial Resource Strain: Not on file   Food Insecurity: Not on file   Transportation Needs: Not on file   Physical Activity: Not on file   Stress: Not on file   Social Connections: Not on file   Intimate Partner Violence: Not on file   Housing Stability: Not on file  "            Family History:   reviewed         Allergies:     Allergies   Allergen Reactions     No Known Allergies              Medications:     Current Outpatient Medications   Medication Sig Dispense Refill     NO ACTIVE MEDICATIONS                  Review of Systems:   The 10 point Review of Systems is negative other than noted in the HPI           Physical Exam:   Blood pressure 124/86, pulse 75, temperature 97.7  F (36.5  C), temperature source Temporal, resp. rate 16, height 1.854 m (6' 1\"), weight 84.4 kg (186 lb).    Exam:  Constitutional: healthy appearing, alert and in no distress  Heent: Normocephalic. Head without obvious masses or lesions. PERRLDC, EOMI. Mouth exam within normal limits: tongue, mucous membranes, posterior pharynx all normal, no lesions or abnormalities seen.  Tm's and canals within normal limits bilaterally. Neck supple, no nuchal rigidity or masses. No supraclavicular, or cervical adenopathy. Thyroid symmetric, no masses.  Cardiovascular: Regular rate and rhythm, no murmer, rub or gallops.  JVP not elevated, no edema.  Carotids within normal limits bilaterally, no bruits.  Respiratory: Normal respiratory effort.  Lungs clear, normal flow, no wheezing or crackles.  Breasts: Normal bilaterally.  No masses or lesions.  Nipples within normal limites.  No axillary lesions or nodes.  Gastrointestinal: Normal active bowel sounds.   Soft, not tender, no masses, guarding or rebound.  No hepatosplenomegaly.   Genitourinary: Rectal min bph  Musculoskeletal: extremities normal, no gross deformities noted.  Skin: no suspicious lesions or rashes   Neurologic: Mental status within normal limits.  Speech fluent.  No gross motor abnormalities and gait intact.  Psychiatric: mentation appears normal and affect normal.         Data:   Labs reviewed with patient         Assessment:   1. Normal complete physical exam  2. Elevated psa, andrea with the patient further evaluation by seeing urology versus " "follow-up PSA in 6 months and he would like to do the latter which is reasonable.  3.  Colon polyp, up-to-date on follow-up.  4.  Hyperlipidemia.  We discussed this extensively.  I explained to him that his 10-year risk is around 18% and that certainly statins would be indicated.  We also discussed doing a coronary CT scan and I have suggested this would be a good way to help us sort this out and he agrees and I will order that although he may not do it for a bit.  5.  CKD, stable.  6.  Healthcare maintenance         Plan:   Exercise, diet  Ct scan  Call if problems      Dayton Francisco M.D.              Patient has been advised of split billing requirements and indicates understanding: Yes  Are you in the first 12 months of your Medicare coverage?  No    Healthy Habits:     In general, how would you rate your overall health?  Good    Frequency of exercise:  4-5 days/week    Duration of exercise:  15-30 minutes    Do you usually eat at least 4 servings of fruit and vegetables a day, include whole grains    & fiber and avoid regularly eating high fat or \"junk\" foods?  No    Taking medications regularly:  Not Applicable    Barriers to taking medications:  None    Medication side effects:  Not applicable    Ability to successfully perform activities of daily living:  No assistance needed    Home Safety:  No safety concerns identified    Hearing Impairment:  No hearing concerns    In the past 6 months, have you been bothered by leaking of urine?  No    In general, how would you rate your overall mental or emotional health?  Excellent      PHQ-2 Total Score: 0    Additional concerns today:  No      Have you ever done Advance Care Planning? (For example, a Health Directive, POLST, or a discussion with a medical provider or your loved ones about your wishes): No, advance care planning information given to patient to review.  Patient plans to discuss their wishes with loved ones or provider.         Fall risk  Fallen 2 or " more times in the past year?: No  Any fall with injury in the past year?: No    Cognitive Screening   1) Repeat 3 items (Leader, Season, Table)    2) Clock draw: NORMAL  3) 3 item recall: Recalls 3 objects  Results: 3 items recalled: COGNITIVE IMPAIRMENT LESS LIKELY    Mini-CogTM Copyright JAYLIN Salgado. Licensed by the author for use in [facility]; reprinted with permission (rory@[facility]). All rights reserved.      Do you have sleep apnea, excessive snoring or daytime drowsiness?: no    Reviewed and updated as needed this visit by clinical staff                  Reviewed and updated as needed this visit by Provider                 Social History     Tobacco Use     Smoking status: Never     Smokeless tobacco: Never   Substance Use Topics     Alcohol use: Yes     Alcohol/week: 0.0 standard drinks     Comment: rare     If you drink alcohol do you typically have >3 drinks per day or >7 drinks per week? No    Alcohol Use 11/21/2022   Prescreen: >3 drinks/day or >7 drinks/week? No   Prescreen: >3 drinks/day or >7 drinks/week? -             Current providers sharing in care for this patient include:   Patient Care Team:  Dayton Francisco MD as PCP - General (Internal Medicine)  Dayton Francisco MD as Assigned PCP  Anastasiia Landaverde MD as MD (Neurology)  Anastasiia Landaverde MD as Assigned Neuroscience Provider    The following health maintenance items are reviewed in Epic and correct as of today:  Health Maintenance   Topic Date Due     ANNUAL REVIEW OF  ORDERS  Never done     ZOSTER IMMUNIZATION (1 of 2) Never done     MICROALBUMIN  11/11/2014     Pneumococcal Vaccine: 65+ Years (1 - PCV) Never done     AORTIC ANEURYSM SCREENING (SYSTEM ASSIGNED)  Never done     COVID-19 Vaccine (4 - Booster for Pfizer series) 01/05/2022     MEDICARE ANNUAL WELLNESS VISIT  07/01/2022     INFLUENZA VACCINE (1) 09/01/2022     BMP  11/10/2023     LIPID  11/10/2023     HEMOGLOBIN  11/10/2023     FALL RISK ASSESSMENT   "11/28/2023     ADVANCE CARE PLANNING  07/01/2026     DTAP/TDAP/TD IMMUNIZATION (4 - Td or Tdap) 11/15/2028     COLORECTAL CANCER SCREENING  10/21/2029     HEPATITIS C SCREENING  Completed     PHQ-2 (once per calendar year)  Completed     URINALYSIS  Completed     IPV IMMUNIZATION  Aged Out     MENINGITIS IMMUNIZATION  Aged Out               Review of Systems      OBJECTIVE:   There were no vitals taken for this visit. Estimated body mass index is 23.75 kg/m  as calculated from the following:    Height as of 7/1/21: 1.854 m (6' 1\").    Weight as of 7/1/21: 81.6 kg (180 lb).  Physical Exam          ASSESSMENT / PLAN:             COUNSELING:  Reviewed preventive health counseling, as reflected in patient instructions       Regular exercise       Healthy diet/nutrition        He reports that he has never smoked. He has never used smokeless tobacco.      Appropriate preventive services were discussed with this patient, including applicable screening as appropriate for cardiovascular disease, diabetes, osteopenia/osteoporosis, and glaucoma.  As appropriate for age/gender, discussed screening for colorectal cancer, prostate cancer, breast cancer, and cervical cancer. Checklist reviewing preventive services available has been given to the patient.    Reviewed patients plan of care and provided an AVS. The Basic Care Plan (routine screening as documented in Health Maintenance) for Matias meets the Care Plan requirement. This Care Plan has been established and reviewed with the Patient.          Dayton Francisco MD  [facility]    Identified Health Risks:  "

## 2022-12-16 ENCOUNTER — TELEPHONE (OUTPATIENT)
Dept: NEUROLOGY | Facility: CLINIC | Age: 72
End: 2022-12-16

## 2022-12-16 DIAGNOSIS — R90.89 ABNORMAL FINDING ON MRI OF BRAIN: Primary | ICD-10-CM

## 2022-12-16 DIAGNOSIS — I67.1 CEREBRAL ANEURYSM, NONRUPTURED: ICD-10-CM

## 2022-12-16 NOTE — TELEPHONE ENCOUNTER
[facility] Call Center    Phone Message    May a detailed message be left on voicemail: yes     Reason for Call: Order(s): Other:   Reason for requested: Annual MRA  Date needed: asap  Provider name: AkhilAnastasiia    Patient is requesting a call back regarding a order for a MRA. Patient states he is coming up on his one year aria from his last MRA and is requesting this to see if the structure has grown or not.     Writer doesn't see active MRA order in chart. Please call pt back to advise at # [phone].      Action Taken: Message routed to:  [facility] neurology     Travel Screening: Not Applicable

## 2022-12-19 NOTE — TELEPHONE ENCOUNTER
Called pt and informed him Dr. Landaverde has ordered MRA and he should have that perfomed in March.  Gave me scheduling # to imaging and gave me clinic # scheduling to schedule appt with Dr. Landaverde and he verbally understood.

## 2023-03-05 ENCOUNTER — TRANSFERRED RECORDS (OUTPATIENT)
Dept: HEALTH INFORMATION MANAGEMENT | Facility: CLINIC | Age: 73
End: 2023-03-05

## 2023-03-06 ENCOUNTER — TRANSFERRED RECORDS (OUTPATIENT)
Dept: HEALTH INFORMATION MANAGEMENT | Facility: CLINIC | Age: 73
End: 2023-03-06
Payer: MEDICARE

## 2023-03-11 ENCOUNTER — ANCILLARY PROCEDURE (OUTPATIENT)
Dept: MRI IMAGING | Facility: CLINIC | Age: 73
End: 2023-03-11
Attending: PSYCHIATRY & NEUROLOGY
Payer: MEDICARE

## 2023-03-11 DIAGNOSIS — I67.1 CEREBRAL ANEURYSM, NONRUPTURED: ICD-10-CM

## 2023-03-11 DIAGNOSIS — R90.89 ABNORMAL FINDING ON MRI OF BRAIN: ICD-10-CM

## 2023-03-11 PROCEDURE — G1010 CDSM STANSON: HCPCS | Mod: GC | Performed by: RADIOLOGY

## 2023-03-11 PROCEDURE — 70544 MR ANGIOGRAPHY HEAD W/O DYE: CPT | Mod: MG | Performed by: RADIOLOGY

## 2023-03-15 ENCOUNTER — HOSPITAL ENCOUNTER (OUTPATIENT)
Dept: CT IMAGING | Facility: CLINIC | Age: 73
Discharge: HOME | End: 2023-03-15
Attending: INTERNAL MEDICINE | Admitting: INTERNAL MEDICINE

## 2023-03-15 DIAGNOSIS — E78.5 HYPERLIPIDEMIA LDL GOAL <160: ICD-10-CM

## 2023-03-15 PROCEDURE — 75571 CT HRT W/O DYE W/CA TEST: CPT | Mod: MF

## 2023-03-15 PROCEDURE — 75571 CT HRT W/O DYE W/CA TEST: CPT | Mod: 26 | Performed by: STUDENT IN AN ORGANIZED HEALTH CARE EDUCATION/TRAINING PROGRAM

## 2023-06-18 ASSESSMENT — HEADACHE IMPACT TEST (HIT 6)
WHEN YOU HAVE HEADACHES HOW OFTEN IS THE PAIN SEVERE: NEVER
HOW OFTEN HAVE YOU FELT FED UP OR IRRITATED BECAUSE OF YOUR HEADACHES: NEVER
HOW OFTEN DO HEADACHES LIMIT YOUR DAILY ACTIVITIES: NEVER
HIT6 TOTAL SCORE: 36
WHEN YOU HAVE A HEADACHE HOW OFTEN DO YOU WISH YOU COULD LIE DOWN: NEVER
HOW OFTEN DID HEADACHS LIMIT CONCENTRATION ON WORK OR DAILY ACTIVITY: NEVER
HOW OFTEN HAVE YOU FELT TOO TIRED TO WORK BECAUSE OF YOUR HEADACHES: NEVER

## 2023-06-18 ASSESSMENT — MIGRAINE DISABILITY ASSESSMENT (MIDAS)
ON A SCALE FROM 0-10 ON AVERAGE HOW PAINFUL WERE HEADACHES: 0
HOW MANY DAYS DID YOU MISS WORK OR SCHOOL BECAUSE OF HEADACHES: 0
HOW MANY DAYS IN THE PAST 3 MONTHS HAVE YOU HAD A HEADACHE: 0
HOW OFTEN WERE SOCIAL ACTIVITIES MISSED DUE TO HEADACHES: 0
HOW MANY DAYS WAS HOUSEWORK PRODUCTIVITY CUT IN HALF DUE TO HEADACHES: 0
TOTAL SCORE: 0
HOW MANY DAYS DID YOU NOT DO HOUSEWORK BECAUSE OF HEADACHES: 0
HOW MANY DAYS WAS YOUR PRODUCTIVITY CUT IN HALF BECAUSE OF HEADACHES: 0

## 2023-06-21 ENCOUNTER — VIRTUAL VISIT (OUTPATIENT)
Dept: NEUROLOGY | Facility: CLINIC | Age: 73
End: 2023-06-21
Payer: MEDICARE

## 2023-06-21 DIAGNOSIS — I63.81 LACUNAR INFARCTION (H): ICD-10-CM

## 2023-06-21 DIAGNOSIS — I67.1 CEREBRAL ANEURYSM, NONRUPTURED: Primary | ICD-10-CM

## 2023-06-21 DIAGNOSIS — G44.52 NEW DAILY PERSISTENT HEADACHE: ICD-10-CM

## 2023-06-21 PROCEDURE — 99215 OFFICE O/P EST HI 40 MIN: CPT | Mod: 95 | Performed by: PSYCHIATRY & NEUROLOGY

## 2023-06-21 NOTE — NURSING NOTE
Is the patient currently in the state of [place]? YES    Visit mode:VIDEO    If the visit is dropped, the patient can be reconnected by: VIDEO VISIT: Send to e-mail at: [email or web address]    Will anyone else be joining the visit? NO      How would you like to obtain your AVS? MyChart    Are changes needed to the allergy or medication list? NO    Reason for visit: Follow Up

## 2023-06-21 NOTE — LETTER
6/21/2023       RE: Matias Daugherty  [address]  [place] [address]     Dear Colleague,    Thank you for referring your patient, Matias Daugherty, to the [facility] [place] [facility]. Please see a copy of my visit note below.    Virtual Visit Details    Type of service:  Video Visit   Video Start Time: 8:37am  Video End Time:9:14 AM    Originating Location (pt. Location): Home  Distant Location (provider location):  Off-site  Platform used for Video Visit: [facility]    Headache Neurology Progress Note  June 21, 2023    Subjective:    Matias Daugherty returns for follow up of daily headaches since 2021, infundibulum seen on MRA, and new lacunar infarct seen on outside head CT.  He also had recent ER visit for left arm and hand numbness.    Left arm hand numbness-his left hand and part of his arm went numb. He went to the ER in early June, with reassuring head CT, but with a lacunar infarct found in the right basal ganglia. He thinks sleeping with his hand under his pillow or leaning on his elbow was the cause.  This is no longer an issue, and he does not desire further work-up for this.    We discussed lacunar infarcts, describes for stroke, and reviewed some possible stroke risk factors in his case.  He has had no other neurologic signs concerning for stroke since his CT scan.   -He has checked his blood pressure, with readings as high as sbp 150. He reduced his salt intake and started checking his bp at home. His readings has been lower, down to 90s/60s. He had a couple readings up to sbp 140s.   -He started a Mediterranean diet and cholesterol improved, he reports. Cholesterol reduced from 225 to 189 in 5 weeks. He reports it increased at the next check, over 200 again. He tried fasting without improvement.   -No concerns for diabetes on previous labs, did not see hemoglobin A1c done.  He  reports that he gets lab completed through his primary care physician, and there is not concern for diabetes.  Denies sleep apnea risk factors.  Denies morning history.    -Overall, his headaches have nearly resolved.  Symptoms of headache were minimal, now even more improved.  He reports being seen at the [facility] in [facility], and his understanding is that the previous signs of intracranial hypotension that were seen are improving imaging, correlate with his improvement clinically as well.  Due to this, myelogram has been able to be avoided. Spontaneous CSF leak was suspected etiology for his new headache.       Regarding the previously seen tiny infundibulum, he had an MRI completed in March 2023.  This did not convincingly show the previous infundibulum.    Objective:    Vitals: There were no vitals taken for this visit.  General: Cooperative, NAD  Neurologic:  Mental Status: Fully alert, attentive and oriented. Speech clear and fluent.   Cranial Nerves: Facial movements symmetric.   Motor: No abnormal movements.      Pertinent Investigations:      MRA head (3/11/2023):  No aneurysm or stenosis of the major intracranial  arteries. Previous tiny focal outpouching of the left anterior  cerebral artery A1/A2 junction is not convincingly seen on today's  exam.    Assessment/Plan:   Matias Daugherty is a 72 year old male who presents for follow-up of headache, MRA scan, and also with new left arm and hand symptoms, lacunar infarct seen on head CT.     We reviewed his imaging and I answered his questions.  Overall, we reviewed stroke risk factors after lacunar infarct was noted on recent head CT. He was not symptomatic from this. Offered stroke workup and recommended aspirin, statin.  -Blood pressure readings variable at home, nearly all within desired range with diet change. He will discuss with PCP if readings elevate again.  -Cholesterol is elevated. I recommended a high intensity statin for stroke  prevention. Reviewed data for stroke reduction with statin. He would like to think about this.  -Recommended daily aspirin 81 mg.   -No previous elevated glucose levels. He did not want to pursue HgbA1C today. Recommended he continue following with PCP regarding screening.  -Continue not smoking.   -Denies sleep apnea risk factors.   -No history of a fib. I do not see a recent EKG, monitoring. Lacunar infarct is less likely embolic, more likely small vessel disease.    The infundibulum that was previously seen was not convincingly seen on the most recent MRA scan.  This is reassuring.    Headaches are nearly resolved. Will hold off on further workup or treatment.     Follow up as needed. I spent 65 minutes on record review, patient care, documentation today.    Anastasiia Landaverde MD  Neurology

## 2023-06-21 NOTE — PROGRESS NOTES
Virtual Visit Details    Type of service:  Video Visit   Video Start Time: 8:37am  Video End Time:9:14 AM    Originating Location (pt. Location): Home  Distant Location (provider location):  Off-site  Platform used for Video Visit: [facility]    Headache Neurology Progress Note  June 21, 2023    Subjective:    Matias Daugherty returns for follow up of daily headaches since 2021, infundibulum seen on MRA, and new lacunar infarct seen on outside head CT.  He also had recent ER visit for left arm and hand numbness.    Left arm hand numbness-his left hand and part of his arm went numb. He went to the ER in early June, with reassuring head CT, but with a lacunar infarct found in the right basal ganglia. He thinks sleeping with his hand under his pillow or leaning on his elbow was the cause.  This is no longer an issue, and he does not desire further work-up for this.    We discussed lacunar infarcts, describes for stroke, and reviewed some possible stroke risk factors in his case.  He has had no other neurologic signs concerning for stroke since his CT scan.   -He has checked his blood pressure, with readings as high as sbp 150. He reduced his salt intake and started checking his bp at home. His readings has been lower, down to 90s/60s. He had a couple readings up to sbp 140s.   -He started a Mediterranean diet and cholesterol improved, he reports. Cholesterol reduced from 225 to 189 in 5 weeks. He reports it increased at the next check, over 200 again. He tried fasting without improvement.   -No concerns for diabetes on previous labs, did not see hemoglobin A1c done.  He reports that he gets lab completed through his primary care physician, and there is not concern for diabetes.  Denies sleep apnea risk factors.  Denies morning history.    -Overall, his headaches have nearly resolved.  Symptoms of headache were minimal, now even more improved.  He reports being seen at the [facility] in [facility], and his understanding is that the previous signs of intracranial hypotension that were seen are improving imaging, correlate with his improvement clinically as well.  Due to this, myelogram has been able to be avoided. Spontaneous CSF leak was suspected etiology for his new headache.       Regarding the previously seen tiny infundibulum, he had an MRI completed in March 2023.  This did not convincingly show the previous infundibulum.    Objective:    Vitals: There were no vitals taken for this visit.  General: Cooperative, NAD  Neurologic:  Mental Status: Fully alert, attentive and oriented. Speech clear and fluent.   Cranial Nerves: Facial movements symmetric.   Motor: No abnormal movements.      Pertinent Investigations:      MRA head (3/11/2023):  No aneurysm or stenosis of the major intracranial  arteries. Previous tiny focal outpouching of the left anterior  cerebral artery A1/A2 junction is not convincingly seen on today's  exam.    Assessment/Plan:   Matias Daugherty is a 72 year old male who presents for follow-up of headache, MRA scan, and also with new left arm and hand symptoms, lacunar infarct seen on head CT.     We reviewed his imaging and I answered his questions.  Overall, we reviewed stroke risk factors after lacunar infarct was noted on recent head CT. He was not symptomatic from this. Offered stroke workup and recommended aspirin, statin.  -Blood pressure readings variable at home, nearly all within desired range with diet change. He will discuss with PCP if readings elevate again.  -Cholesterol is elevated. I recommended a high intensity statin for stroke prevention. Reviewed data for stroke reduction with statin. He would like to think about this.  -Recommended daily aspirin 81 mg.   -No previous elevated glucose levels. He did not want to pursue HgbA1C today. Recommended he continue following with PCP regarding screening.  -Continue not smoking.   -Denies sleep  apnea risk factors.   -No history of a fib. I do not see a recent EKG, monitoring. Lacunar infarct is less likely embolic, more likely small vessel disease.    The infundibulum that was previously seen was not convincingly seen on the most recent MRA scan.  This is reassuring.    Headaches are nearly resolved. Will hold off on further workup or treatment.     Follow up as needed. I spent 65 minutes on record review, patient care, documentation today.    Anastasiia Landaverde MD  Neurology

## 2023-10-30 ENCOUNTER — PATIENT OUTREACH (OUTPATIENT)
Dept: CARE COORDINATION | Facility: CLINIC | Age: 73
End: 2023-10-30
Payer: MEDICARE

## 2023-11-13 ENCOUNTER — PATIENT OUTREACH (OUTPATIENT)
Dept: CARE COORDINATION | Facility: CLINIC | Age: 73
End: 2023-11-13
Payer: MEDICARE

## 2024-01-19 ENCOUNTER — PATIENT OUTREACH (OUTPATIENT)
Dept: GASTROENTEROLOGY | Facility: CLINIC | Age: 74
End: 2024-01-19
Payer: MEDICARE

## 2024-01-21 ENCOUNTER — HEALTH MAINTENANCE LETTER (OUTPATIENT)
Age: 74
End: 2024-01-21

## 2024-04-30 ENCOUNTER — TELEPHONE (OUTPATIENT)
Dept: FAMILY MEDICINE | Facility: CLINIC | Age: 74
End: 2024-04-30
Payer: MEDICARE

## 2024-05-26 ENCOUNTER — PATIENT OUTREACH (OUTPATIENT)
Dept: CARE COORDINATION | Facility: CLINIC | Age: 74
End: 2024-05-26
Payer: MEDICARE

## 2024-07-02 ENCOUNTER — DOCUMENTATION ONLY (OUTPATIENT)
Dept: FAMILY MEDICINE | Facility: CLINIC | Age: 74
End: 2024-07-02
Payer: MEDICARE

## 2024-07-02 DIAGNOSIS — Z00.00 MEDICARE ANNUAL WELLNESS VISIT, SUBSEQUENT: Primary | ICD-10-CM

## 2024-07-02 DIAGNOSIS — E78.5 HYPERLIPIDEMIA LDL GOAL <160: ICD-10-CM

## 2024-07-02 DIAGNOSIS — N18.31 STAGE 3A CHRONIC KIDNEY DISEASE (H): ICD-10-CM

## 2024-07-02 NOTE — PROGRESS NOTES
Matias Daugherty has an upcoming lab appointment:    Future Appointments   Date Time Provider Department Center   7/8/2024  1:00 PM NE LAB [facility]   7/11/2024  1:00 PM Dayton Francisco MD [facility]     Patient is scheduled for the following lab(s): basic lipid hgb    There is no order available. Please review and place either future orders or HMPO (Review of Health Maintenance Protocol Orders), as appropriate.    Health Maintenance Due   Topic    ANNUAL REVIEW OF HM ORDERS     MICROALBUMIN     BMP     LIPID     HEMOGLOBIN      Charlette Aranda

## 2024-07-03 ENCOUNTER — TELEPHONE (OUTPATIENT)
Dept: FAMILY MEDICINE | Facility: CLINIC | Age: 74
End: 2024-07-03
Payer: MEDICARE

## 2024-07-03 DIAGNOSIS — E78.5 HYPERLIPIDEMIA LDL GOAL <160: ICD-10-CM

## 2024-07-03 DIAGNOSIS — Z00.00 MEDICARE ANNUAL WELLNESS VISIT, SUBSEQUENT: Primary | ICD-10-CM

## 2024-07-03 DIAGNOSIS — Z12.5 ENCOUNTER FOR SCREENING FOR MALIGNANT NEOPLASM OF PROSTATE: ICD-10-CM

## 2024-07-03 DIAGNOSIS — N18.31 STAGE 3A CHRONIC KIDNEY DISEASE (H): ICD-10-CM

## 2024-07-03 NOTE — TELEPHONE ENCOUNTER
Order/Referral Request    Who is requesting: patient would like psa and  UA and cardioik advanced lipid panel    Orders being requested: would like psa and  UA and cardio iq advanced lipid panel    Reason service is needed/diagnosis: done every year . A  in [place] wants the lipid    When are orders needed by: asap have appt monday    Has this been discussed with Provider: Yes    Does patient have a preference on a Group/Provider/Facility?     Does patient have an appointment scheduled?: Yes:     Where to send orders: Place orders within Epic    Could we send this information to you in [employer] or would you prefer to receive a phone call?:   Patient would prefer a phone call   Okay to leave a detailed message?: Yes at Work number on file:[phone]

## 2024-07-05 NOTE — TELEPHONE ENCOUNTER
I have ordered all the tests except for the advanced lipid panel.  This is something that can be very expensive and I am not sure it is worth it so I think we should discuss it when he comes in.    Thank you    Dayton Francisco M.D.

## 2024-07-06 SDOH — HEALTH STABILITY: PHYSICAL HEALTH: ON AVERAGE, HOW MANY MINUTES DO YOU ENGAGE IN EXERCISE AT THIS LEVEL?: 50 MIN

## 2024-07-06 SDOH — HEALTH STABILITY: PHYSICAL HEALTH: ON AVERAGE, HOW MANY DAYS PER WEEK DO YOU ENGAGE IN MODERATE TO STRENUOUS EXERCISE (LIKE A BRISK WALK)?: 7 DAYS

## 2024-07-06 ASSESSMENT — SOCIAL DETERMINANTS OF HEALTH (SDOH): HOW OFTEN DO YOU GET TOGETHER WITH FRIENDS OR RELATIVES?: TWICE A WEEK

## 2024-07-08 ENCOUNTER — LAB (OUTPATIENT)
Dept: LAB | Facility: CLINIC | Age: 74
End: 2024-07-08
Payer: MEDICARE

## 2024-07-08 DIAGNOSIS — N18.31 STAGE 3A CHRONIC KIDNEY DISEASE (H): ICD-10-CM

## 2024-07-08 DIAGNOSIS — Z12.5 ENCOUNTER FOR SCREENING FOR MALIGNANT NEOPLASM OF PROSTATE: ICD-10-CM

## 2024-07-08 DIAGNOSIS — Z00.00 MEDICARE ANNUAL WELLNESS VISIT, SUBSEQUENT: ICD-10-CM

## 2024-07-08 DIAGNOSIS — E78.5 HYPERLIPIDEMIA LDL GOAL <160: ICD-10-CM

## 2024-07-08 LAB
ALBUMIN SERPL BCG-MCNC: 4.4 G/DL (ref 3.5–5.2)
ALBUMIN UR-MCNC: NEGATIVE MG/DL
ALP SERPL-CCNC: 82 U/L (ref 40–150)
ALT SERPL W P-5'-P-CCNC: 9 U/L (ref 0–70)
ANION GAP SERPL CALCULATED.3IONS-SCNC: 9 MMOL/L (ref 7–15)
APPEARANCE UR: CLEAR
AST SERPL W P-5'-P-CCNC: 20 U/L (ref 0–45)
BACTERIA #/AREA URNS HPF: ABNORMAL /HPF
BILIRUB SERPL-MCNC: 1.1 MG/DL
BILIRUB UR QL STRIP: NEGATIVE
BUN SERPL-MCNC: 13.9 MG/DL (ref 8–23)
CALCIUM SERPL-MCNC: 9.5 MG/DL (ref 8.8–10.2)
CHLORIDE SERPL-SCNC: 102 MMOL/L (ref 98–107)
CHOLEST SERPL-MCNC: 263 MG/DL
COLOR UR AUTO: YELLOW
CREAT SERPL-MCNC: 1.74 MG/DL (ref 0.67–1.17)
CREAT UR-MCNC: 157 MG/DL
DEPRECATED HCO3 PLAS-SCNC: 28 MMOL/L (ref 22–29)
EGFRCR SERPLBLD CKD-EPI 2021: 41 ML/MIN/1.73M2
ERYTHROCYTE [DISTWIDTH] IN BLOOD BY AUTOMATED COUNT: 14.2 % (ref 10–15)
FASTING STATUS PATIENT QL REPORTED: YES
FASTING STATUS PATIENT QL REPORTED: YES
GLUCOSE SERPL-MCNC: 103 MG/DL (ref 70–99)
GLUCOSE UR STRIP-MCNC: NEGATIVE MG/DL
HCT VFR BLD AUTO: 50.7 % (ref 40–53)
HDLC SERPL-MCNC: 60 MG/DL
HGB BLD-MCNC: 16.5 G/DL (ref 13.3–17.7)
HGB UR QL STRIP: ABNORMAL
KETONES UR STRIP-MCNC: ABNORMAL MG/DL
LDLC SERPL CALC-MCNC: 178 MG/DL
LEUKOCYTE ESTERASE UR QL STRIP: NEGATIVE
MCH RBC QN AUTO: 31.3 PG (ref 26.5–33)
MCHC RBC AUTO-ENTMCNC: 32.5 G/DL (ref 31.5–36.5)
MCV RBC AUTO: 96 FL (ref 78–100)
MICROALBUMIN UR-MCNC: 12.1 MG/L
MICROALBUMIN/CREAT UR: 7.71 MG/G CR (ref 0–17)
NITRATE UR QL: NEGATIVE
NONHDLC SERPL-MCNC: 203 MG/DL
PH UR STRIP: 6 [PH] (ref 5–7)
PLATELET # BLD AUTO: 202 10E3/UL (ref 150–450)
POTASSIUM SERPL-SCNC: 5.4 MMOL/L (ref 3.4–5.3)
PROT SERPL-MCNC: 7.2 G/DL (ref 6.4–8.3)
PSA SERPL DL<=0.01 NG/ML-MCNC: 5.09 NG/ML (ref 0–6.5)
RBC # BLD AUTO: 5.28 10E6/UL (ref 4.4–5.9)
RBC #/AREA URNS AUTO: ABNORMAL /HPF
SODIUM SERPL-SCNC: 139 MMOL/L (ref 135–145)
SP GR UR STRIP: 1.01 (ref 1–1.03)
SQUAMOUS #/AREA URNS AUTO: ABNORMAL /LPF
TRIGL SERPL-MCNC: 124 MG/DL
UROBILINOGEN UR STRIP-ACNC: 1 E.U./DL
WBC # BLD AUTO: 4.4 10E3/UL (ref 4–11)
WBC #/AREA URNS AUTO: ABNORMAL /HPF

## 2024-07-08 PROCEDURE — 82570 ASSAY OF URINE CREATININE: CPT

## 2024-07-08 PROCEDURE — 80061 LIPID PANEL: CPT

## 2024-07-08 PROCEDURE — G0103 PSA SCREENING: HCPCS

## 2024-07-08 PROCEDURE — 81001 URINALYSIS AUTO W/SCOPE: CPT

## 2024-07-08 PROCEDURE — 80053 COMPREHEN METABOLIC PANEL: CPT

## 2024-07-08 PROCEDURE — 82043 UR ALBUMIN QUANTITATIVE: CPT

## 2024-07-08 PROCEDURE — 36415 COLL VENOUS BLD VENIPUNCTURE: CPT

## 2024-07-08 PROCEDURE — 85027 COMPLETE CBC AUTOMATED: CPT

## 2024-07-11 ENCOUNTER — OFFICE VISIT (OUTPATIENT)
Dept: FAMILY MEDICINE | Facility: CLINIC | Age: 74
End: 2024-07-11
Payer: MEDICARE

## 2024-07-11 VITALS
HEART RATE: 75 BPM | SYSTOLIC BLOOD PRESSURE: 136 MMHG | HEIGHT: 73 IN | WEIGHT: 180.1 LBS | DIASTOLIC BLOOD PRESSURE: 88 MMHG | BODY MASS INDEX: 23.87 KG/M2 | OXYGEN SATURATION: 99 % | RESPIRATION RATE: 18 BRPM | TEMPERATURE: 96.6 F

## 2024-07-11 DIAGNOSIS — D12.6 TUBULAR ADENOMA OF COLON: ICD-10-CM

## 2024-07-11 DIAGNOSIS — R93.1 AGATSTON CORONARY ARTERY CALCIUM SCORE LESS THAN 100: ICD-10-CM

## 2024-07-11 DIAGNOSIS — I63.81 LACUNAR INFARCTION (H): ICD-10-CM

## 2024-07-11 DIAGNOSIS — R97.20 ELEVATED PROSTATE SPECIFIC ANTIGEN (PSA): ICD-10-CM

## 2024-07-11 DIAGNOSIS — N18.31 STAGE 3A CHRONIC KIDNEY DISEASE (H): ICD-10-CM

## 2024-07-11 DIAGNOSIS — E78.5 HYPERLIPIDEMIA LDL GOAL <160: ICD-10-CM

## 2024-07-11 DIAGNOSIS — Z00.00 ENCOUNTER FOR MEDICARE ANNUAL WELLNESS EXAM: Primary | ICD-10-CM

## 2024-07-11 PROCEDURE — G0439 PPPS, SUBSEQ VISIT: HCPCS | Performed by: INTERNAL MEDICINE

## 2024-07-11 ASSESSMENT — PAIN SCALES - GENERAL: PAINLEVEL: NO PAIN (0)

## 2024-07-11 NOTE — PROGRESS NOTES
Preventive Care Visit  [facility] MIGUE Francisco MD, Internal Medicine  Jul 11, 2024          Ravi Park is a 73 year old, presenting for the following:    He is doing quite well and works out very regularly.  He sees a renal doctor in [place] now.  He was found to have an incidental lacunar infarct on the prior CT.  He has a history of tubular adenoma of the colon.  He is up-to-date on that and wants to schedule it himself.  He has hyperlipidemia with positive calcium score.  He feels well.  He has no complaints.               Past Medical History:      Past Medical History:   Diagnosis Date    Abdominal pain 2004    ct with liver lesion then mri done and felt to be hemangioma    Agatston coronary artery calcium score less than 100 03/2023    87.4    CKD (chronic kidney disease) stage 3, GFR 30-59 ml/min (H)     seeing renal in [place]    Elevated prostate specific antigen (PSA) 02/2021    since 2019    Hx of colonoscopy 2008    nl    Hypercholesteremia     Hypertension Not sure    Sometimes not    Lacunar infarction (H) 03/2023    on ct done for hand numbness in [place]    Liver hemangioma 2004    on above ct    Microhematuria 2004    Dr. Jameson, cysto and ct, ct as above    Tubular adenoma of colon 10/2019    2mm             Past Surgical History:      Past Surgical History:   Procedure Laterality Date    COLONOSCOPY  2 years ago?    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 No   Social History Narrative    Not on file     Social Determinants of Health     Financial Resource Strain: Low Risk  (7/6/2024)    Financial Resource Strain     Within the past 12 months, have you or your family members you live with been unable to get utilities (heat, electricity) when it was really needed?: No   Food Insecurity: Low Risk  (7/6/2024)    Food Insecurity     Within the past 12 months, did you worry that your food would run out before you got money to buy more?: No     Within the past 12 months, did the food you bought just not last and you didn t have money to get more?: No   Transportation Needs: Low Risk  (7/6/2024)    Transportation Needs     Within the past 12 months, has lack of transportation kept you from medical appointments, getting your medicines, non-medical meetings or appointments, work, or from getting things that you need?: No   Physical Activity: Sufficiently Active (7/6/2024)    Exercise Vital Sign     Days of Exercise per Week: 7 days     Minutes of Exercise per Session: 50 min   Stress: No Stress Concern Present (7/6/2024)    [employer] [place] [employer] - Occupational Stress Questionnaire     Feeling of Stress : Not at all   Social Connections: Unknown (7/6/2024)    Social Connection and Isolation Panel [NHANES]     Frequency of Communication with Friends and Family: Not on file     Frequency of Social Gatherings with Friends and Family: Twice a week     Attends [employer] Services: Not on file     Active Member of Clubs or Organizations: Not on file     Attends Club or Organization Meetings: Not on file     Marital Status: Not on file   Interpersonal Safety: Low Risk  (7/11/2024)    Interpersonal Safety     Do you feel physically and emotionally safe where you currently live?: Yes     Within the past 12 months, have you been hit, slapped, kicked or otherwise physically hurt by someone?: No      "Within the past 12 months, have you been humiliated or emotionally abused in other ways by your partner or ex-partner?: No   Housing Stability: Low Risk  (7/6/2024)    Housing Stability     Do you have housing? : Yes     Are you worried about losing your housing?: No             Family History:   reviewed         Allergies:     Allergies   Allergen Reactions    No Known Allergies              Medications:     No current outpatient medications on file.               Review of Systems:     The 10 point Review of Systems is negative other than noted in the HPI           Physical Exam:   Blood pressure 136/88, pulse 75, temperature (!) 96.6  F (35.9  C), temperature source Temporal, resp. rate 18, height 1.854 m (6' 1\"), weight 81.7 kg (180 lb 1.6 oz), SpO2 99%.    Exam:  Constitutional: healthy appearing, alert and in no distress  Heent: Normocephalic. Head without obvious masses or lesions. PERRLDC, EOMI. Mouth exam within normal limits: tongue, mucous membranes, posterior pharynx all normal, no lesions or abnormalities seen.  Tm's and canals within normal limits bilaterally. Neck supple, no nuchal rigidity or masses. No supraclavicular, or cervical adenopathy. Thyroid symmetric, no masses.  Cardiovascular: Regular rate and rhythm, no murmer, rub or gallops.  JVP not elevated, no edema.  Carotids within normal limits bilaterally, no bruits.  Respiratory: Normal respiratory effort.  Lungs clear, normal flow, no wheezing or crackles.  Breasts: Normal bilaterally.  No masses or lesions.  Nipples within normal limites.  No axillary lesions or nodes.  Gastrointestinal: Normal active bowel sounds.   Soft, not tender, no masses, guarding or rebound.  No hepatosplenomegaly.   Genitourinary: Rectal mod bph  Musculoskeletal: extremities normal, no gross deformities noted.  Skin: no suspicious lesions or rashes   Neurologic: Mental status within normal limits.  Speech fluent.  No gross motor abnormalities and gait " intact.  Psychiatric: mentation appears normal and affect normal.         Data:   Labs reviewed with patient         Assessment:   Normal complete physical exam  Elevated PSA, relatively stable, follow-up for repeat level in 6 months  Elevated cholesterol, he also has a history of a lacunar infarct as well as CKD and a positive calcium score.  I stressed to him that he really should be on statin therapy but he does not want it.  He will work on diet and exercise  Colon polyp, follow-up as noted  CKD, follow-up with renal  Healthcare maintenance         Plan:   He does not want vaccines  Above regarding statin therapy  Follow-up with renal        Dayton Francisco M.D.        Physical        7/11/2024    12:55 PM   Additional Questions   Roomed by [facility]         Health Care Directive  Patient does not have a Health Care Directive or Living Will: Discussed advance care planning with patient; information given to patient to review.    HPI              7/6/2024   General Health   How would you rate your overall physical health? Good   Feel stress (tense, anxious, or unable to sleep) Not at all            7/6/2024   Nutrition   Diet: Low salt    Breakfast skipped       Multiple values from one day are sorted in reverse-chronological order         7/6/2024   Exercise   Days per week of moderate/strenous exercise 7 days   Average minutes spent exercising at this level 50 min            7/6/2024   Social Factors   Frequency of gathering with friends or relatives Twice a week   Worry food won't last until get money to buy more No   Food not last or not have enough money for food? No   Do you have housing? (Housing is defined as stable permanent housing and does not include staying ouside in a car, in a tent, in an abandoned building, in an overnight shelter, or couch-surfing.) Yes   Are you worried about losing your housing? No   Lack of transportation? No   Unable to get utilities (heat,electricity)? No            7/6/2024    Fall Risk   Fallen 2 or more times in the past year? No    No   Trouble with walking or balance? No    No       Multiple values from one day are sorted in reverse-chronological order          7/6/2024   Activities of Daily Living- Home Safety   Needs help with the following daily activites None of the above   Safety concerns in the home None of the above            7/6/2024   Dental   Dentist two times every year? (!) NO            7/6/2024   Hearing Screening   Hearing concerns? None of the above            7/6/2024   Driving Risk Screening   Patient/family members have concerns about driving No            7/6/2024   General Alertness/Fatigue Screening   Have you been more tired than usual lately? No            7/6/2024   Urinary Incontinence Screening   Bothered by leaking urine in past 6 months No            7/6/2024   TB Screening   Were you born outside of the [place]? No            Today's PHQ-2 Score:       7/11/2024    12:52 PM   PHQ-2 ( 1999 [employer])   Q1: Little interest or pleasure in doing things 0   Q2: Feeling down, depressed or hopeless 0   PHQ-2 Score 0   Q1: Little interest or pleasure in doing things Not at all   Q2: Feeling down, depressed or hopeless Not at all   PHQ-2 Score 0           7/6/2024   Substance Use   Alcohol more than 3/day or more than 7/wk No   Do you have a current opioid prescription? No   How severe/bad is pain from 1 to 10? 0/10 (No Pain)   Do you use any other substances recreationally? No        Social History     Tobacco Use    Smoking status: Never    Smokeless tobacco: Never   Substance Use Topics    Alcohol use: Yes     Comment: Infrequent    Drug use: No           7/6/2024   AAA Screening   Family history of Abdominal Aortic Aneurysm (AAA)? No      ASCVD Risk   The ASCVD Risk score (Raman ZAMORA, et al., 2019) failed to calculate for the following reasons:    The patient has a prior MI or stroke diagnosis            Reviewed and updated as needed this visit by  "Provider       Med Hx                Current providers sharing in care for this patient include:  Patient Care Team:  Dayton Francisco MD as PCP - General (Internal Medicine)  Dayton Francisco MD as Assigned PCP  Anastasiia Landaverde MD as MD (Neurology)  Anastasiia Landaverde MD as Assigned Neuroscience Provider    The following health maintenance items are reviewed in Epic and correct as of today:  Health Maintenance   Topic Date Due    ANNUAL REVIEW OF HM ORDERS  Never done    ZOSTER IMMUNIZATION (1 of 2) Never done    RSV VACCINE (Pregnancy & 60+) (1 - 1-dose 60+ series) Never done    Pneumococcal Vaccine: 65+ Years (1 of 1 - PCV) Never done    COVID-19 Vaccine (4 - 2023-24 season) 09/01/2023    INFLUENZA VACCINE (1) 09/01/2024    BMP  07/08/2025    LIPID  07/08/2025    MICROALBUMIN  07/08/2025    HEMOGLOBIN  07/08/2025    MEDICARE ANNUAL WELLNESS VISIT  07/11/2025    FALL RISK ASSESSMENT  07/11/2025    COLORECTAL CANCER SCREENING  10/21/2026    GLUCOSE  07/08/2027    DTAP/TDAP/TD IMMUNIZATION (3 - Td or Tdap) 11/15/2028    ADVANCE CARE PLANNING  07/11/2029    HEPATITIS C SCREENING  Completed    PHQ-2 (once per calendar year)  Completed    URINALYSIS  Completed    IPV IMMUNIZATION  Aged Out    HPV IMMUNIZATION  Aged Out    MENINGITIS IMMUNIZATION  Aged Out    RSV MONOCLONAL ANTIBODY  Aged Out            Objective    Exam  BP (!) 161/89 (BP Location: Left arm, Cuff Size: Adult Regular)   Pulse 75   Temp (!) 96.6  F (35.9  C) (Temporal)   Resp 18   Ht 1.854 m (6' 1\")   Wt 81.7 kg (180 lb 1.6 oz)   SpO2 99%   BMI 23.76 kg/m     Estimated body mass index is 23.76 kg/m  as calculated from the following:    Height as of this encounter: 1.854 m (6' 1\").    Weight as of this encounter: 81.7 kg (180 lb 1.6 oz).    Physical Exam           7/11/2024   Mini Cog   Clock Draw Score 2 Normal   3 Item Recall 3 objects recalled   Mini Cog Total Score 5                 Signed Electronically by: Dayton Francisco, " MD     Cibinqo Pregnancy And Lactation Text: It is unknown if this medication will adversely affect pregnancy or breast feeding.  You should not take this medication if you are currently pregnant or planning a pregnancy or while breastfeeding.

## 2024-07-11 NOTE — PATIENT INSTRUCTIONS
I would get the shingles shot, prevnar 20 pneumonia shot, covid shot all at your pharmacy, and the rsv vaccine in the fall    I would do another psa in 6 monthsPatient Education   Preventive Care Advice   This is general advice given by our system to help you stay healthy. However, your care team may have specific advice just for you. Please talk to your care team about your preventive care needs.  Nutrition  Eat 5 or more servings of fruits and vegetables each day.  Try wheat bread, brown rice and whole grain pasta (instead of white bread, rice, and pasta).  Get enough calcium and vitamin D. Check the label on foods and aim for 100% of the RDA (recommended daily allowance).  Lifestyle  Exercise at least 150 minutes each week  (30 minutes a day, 5 days a week).  Do muscle strengthening activities 2 days a week. These help control your weight and prevent disease.  No smoking.  Wear sunscreen to prevent skin cancer.  Have a dental exam and cleaning every 6 months.  Yearly exams  See your health care team every year to talk about:  Any changes in your health.  Any medicines your care team has prescribed.  Preventive care, family planning, and ways to prevent chronic diseases.  Shots (vaccines)   HPV shots (up to age 26), if you've never had them before.  Hepatitis B shots (up to age 59), if you've never had them before.  COVID-19 shot: Get this shot when it's due.  Flu shot: Get a flu shot every year.  Tetanus shot: Get a tetanus shot every 10 years.  Pneumococcal, hepatitis A, and RSV shots: Ask your care team if you need these based on your risk.  Shingles shot (for age 50 and up)  General health tests  Diabetes screening:  Starting at age 35, Get screened for diabetes at least every 3 years.  If you are younger than age 35, ask your care team if you should be screened for diabetes.  Cholesterol test: At age 39, start having a cholesterol test every 5 years, or more often if advised.  Bone density scan (DEXA): At age  50, ask your care team if you should have this scan for osteoporosis (brittle bones).  Hepatitis C: Get tested at least once in your life.  STIs (sexually transmitted infections)  Before age 24: Ask your care team if you should be screened for STIs.  After age 24: Get screened for STIs if you're at risk. You are at risk for STIs (including HIV) if:  You are sexually active with more than one person.  You don't use condoms every time.  You or a partner was diagnosed with a sexually transmitted infection.  If you are at risk for HIV, ask about PrEP medicine to prevent HIV.  Get tested for HIV at least once in your life, whether you are at risk for HIV or not.  Cancer screening tests  Cervical cancer screening: If you have a cervix, begin getting regular cervical cancer screening tests starting at age 21.  Breast cancer scan (mammogram): If you've ever had breasts, begin having regular mammograms starting at age 40. This is a scan to check for breast cancer.  Colon cancer screening: It is important to start screening for colon cancer at age 45.  Have a colonoscopy test every 10 years (or more often if you're at risk) Or, ask your provider about stool tests like a FIT test every year or Cologuard test every 3 years.  To learn more about your testing options, visit:   .  For help making a decision, visit:   https://bit.ly/rs07578.  Prostate cancer screening test: If you have a prostate, ask your care team if a prostate cancer screening test (PSA) at age 55 is right for you.  Lung cancer screening: If you are a current or former smoker ages 50 to 80, ask your care team if ongoing lung cancer screenings are right for you.  For informational purposes only. Not to replace the advice of your health care provider. Copyright   2023 [employer]. All rights reserved. Clinically reviewed by the [facility] Transitions Program. Heidi Shaulis [id number] - REV 01/24.

## 2025-07-02 ENCOUNTER — LAB (OUTPATIENT)
Dept: LAB | Facility: CLINIC | Age: 75
End: 2025-07-02
Payer: MEDICARE

## 2025-07-02 DIAGNOSIS — Z00.00 MEDICARE ANNUAL WELLNESS VISIT, SUBSEQUENT: ICD-10-CM

## 2025-07-02 DIAGNOSIS — Z12.5 ENCOUNTER FOR SCREENING FOR MALIGNANT NEOPLASM OF PROSTATE: ICD-10-CM

## 2025-07-02 LAB
ALBUMIN UR-MCNC: NEGATIVE MG/DL
APPEARANCE UR: CLEAR
BILIRUB UR QL STRIP: NEGATIVE
COLOR UR AUTO: YELLOW
GLUCOSE UR STRIP-MCNC: NEGATIVE MG/DL
HGB UR QL STRIP: NEGATIVE
KETONES UR STRIP-MCNC: NEGATIVE MG/DL
LEUKOCYTE ESTERASE UR QL STRIP: NEGATIVE
NITRATE UR QL: NEGATIVE
PH UR STRIP: 6 [PH] (ref 5–7)
PSA SERPL DL<=0.01 NG/ML-MCNC: 5.91 NG/ML (ref 0–6.5)
SP GR UR STRIP: <=1.005 (ref 1–1.03)
UROBILINOGEN UR STRIP-ACNC: 0.2 E.U./DL

## 2025-07-02 PROCEDURE — G0103 PSA SCREENING: HCPCS

## 2025-07-02 PROCEDURE — 81003 URINALYSIS AUTO W/O SCOPE: CPT

## 2025-07-02 PROCEDURE — 36415 COLL VENOUS BLD VENIPUNCTURE: CPT

## 2025-07-15 ENCOUNTER — LAB (OUTPATIENT)
Dept: LAB | Facility: CLINIC | Age: 75
End: 2025-07-15
Payer: MEDICARE

## 2025-07-15 DIAGNOSIS — R93.1 AGATSTON CORONARY ARTERY CALCIUM SCORE LESS THAN 100: ICD-10-CM

## 2025-07-15 DIAGNOSIS — Z00.00 MEDICARE ANNUAL WELLNESS VISIT, SUBSEQUENT: ICD-10-CM

## 2025-07-15 DIAGNOSIS — N18.31 STAGE 3A CHRONIC KIDNEY DISEASE (H): ICD-10-CM

## 2025-07-15 DIAGNOSIS — E78.5 HYPERLIPIDEMIA LDL GOAL <160: ICD-10-CM

## 2025-07-15 LAB
ALBUMIN SERPL BCG-MCNC: 4.1 G/DL (ref 3.5–5.2)
ALP SERPL-CCNC: 78 U/L (ref 40–150)
ALT SERPL W P-5'-P-CCNC: 9 U/L (ref 0–70)
ANION GAP SERPL CALCULATED.3IONS-SCNC: 11 MMOL/L (ref 7–15)
AST SERPL W P-5'-P-CCNC: 22 U/L (ref 0–45)
BILIRUB SERPL-MCNC: 0.8 MG/DL
BUN SERPL-MCNC: 22.2 MG/DL (ref 8–23)
CALCIUM SERPL-MCNC: 9.6 MG/DL (ref 8.8–10.4)
CHLORIDE SERPL-SCNC: 105 MMOL/L (ref 98–107)
CHOLEST SERPL-MCNC: 290 MG/DL
CREAT SERPL-MCNC: 1.54 MG/DL (ref 0.67–1.17)
CREAT UR-MCNC: 182 MG/DL
EGFRCR SERPLBLD CKD-EPI 2021: 48 ML/MIN/1.73M2
ERYTHROCYTE [DISTWIDTH] IN BLOOD BY AUTOMATED COUNT: 13.7 % (ref 10–15)
EST. AVERAGE GLUCOSE BLD GHB EST-MCNC: 100 MG/DL
FASTING STATUS PATIENT QL REPORTED: YES
FASTING STATUS PATIENT QL REPORTED: YES
GLUCOSE SERPL-MCNC: 109 MG/DL (ref 70–99)
HBA1C MFR BLD: 5.1 % (ref 0–5.6)
HCO3 SERPL-SCNC: 25 MMOL/L (ref 22–29)
HCT VFR BLD AUTO: 49.4 % (ref 40–53)
HDLC SERPL-MCNC: 52 MG/DL
HGB BLD-MCNC: 16.3 G/DL (ref 13.3–17.7)
LDLC SERPL CALC-MCNC: 202 MG/DL
MCH RBC QN AUTO: 31.5 PG (ref 26.5–33)
MCHC RBC AUTO-ENTMCNC: 33 G/DL (ref 31.5–36.5)
MCV RBC AUTO: 96 FL (ref 78–100)
MICROALBUMIN UR-MCNC: 15.7 MG/L
MICROALBUMIN/CREAT UR: 8.63 MG/G CR (ref 0–17)
NONHDLC SERPL-MCNC: 238 MG/DL
PLATELET # BLD AUTO: 196 10E3/UL (ref 150–450)
POTASSIUM SERPL-SCNC: 5 MMOL/L (ref 3.4–5.3)
PROT SERPL-MCNC: 7.3 G/DL (ref 6.4–8.3)
RBC # BLD AUTO: 5.17 10E6/UL (ref 4.4–5.9)
SODIUM SERPL-SCNC: 141 MMOL/L (ref 135–145)
TRIGL SERPL-MCNC: 178 MG/DL
WBC # BLD AUTO: 4.8 10E3/UL (ref 4–11)

## 2025-07-15 PROCEDURE — 83036 HEMOGLOBIN GLYCOSYLATED A1C: CPT | Mod: GZ

## 2025-07-15 PROCEDURE — 82043 UR ALBUMIN QUANTITATIVE: CPT

## 2025-07-15 PROCEDURE — 36415 COLL VENOUS BLD VENIPUNCTURE: CPT

## 2025-07-15 PROCEDURE — 80053 COMPREHEN METABOLIC PANEL: CPT

## 2025-07-15 PROCEDURE — 85027 COMPLETE CBC AUTOMATED: CPT

## 2025-07-15 PROCEDURE — 82570 ASSAY OF URINE CREATININE: CPT

## 2025-07-15 PROCEDURE — 80061 LIPID PANEL: CPT
